# Patient Record
Sex: MALE | Race: WHITE | NOT HISPANIC OR LATINO | Employment: FULL TIME | URBAN - NONMETROPOLITAN AREA
[De-identification: names, ages, dates, MRNs, and addresses within clinical notes are randomized per-mention and may not be internally consistent; named-entity substitution may affect disease eponyms.]

---

## 2018-08-16 ENCOUNTER — OFFICE VISIT CONVERTED (OUTPATIENT)
Dept: FAMILY MEDICINE CLINIC | Age: 58
End: 2018-08-16
Attending: NURSE PRACTITIONER

## 2018-11-02 ENCOUNTER — OFFICE VISIT CONVERTED (OUTPATIENT)
Dept: FAMILY MEDICINE CLINIC | Age: 58
End: 2018-11-02
Attending: NURSE PRACTITIONER

## 2018-12-04 ENCOUNTER — OFFICE VISIT CONVERTED (OUTPATIENT)
Dept: FAMILY MEDICINE CLINIC | Age: 58
End: 2018-12-04
Attending: NURSE PRACTITIONER

## 2019-02-12 ENCOUNTER — HOSPITAL ENCOUNTER (OUTPATIENT)
Dept: OTHER | Facility: HOSPITAL | Age: 59
Discharge: HOME | End: 2019-02-12

## 2019-02-12 LAB
CONV CREATININE URINE, RANDOM: 309.2 MG/DL (ref 10–300)
CONV MICROALBUM.,U,RANDOM: 47.8 MG/L (ref 0–20)
MICROALBUMIN/CREAT UR: 15.5 MG/G{CRE} (ref 0–25)

## 2019-02-22 ENCOUNTER — OFFICE VISIT CONVERTED (OUTPATIENT)
Dept: FAMILY MEDICINE CLINIC | Age: 59
End: 2019-02-22
Attending: NURSE PRACTITIONER

## 2019-02-22 ENCOUNTER — HOSPITAL ENCOUNTER (OUTPATIENT)
Dept: OTHER | Facility: HOSPITAL | Age: 59
Discharge: HOME | End: 2019-02-22

## 2019-02-22 LAB
ALBUMIN SERPL-MCNC: 4.3 G/DL (ref 3.5–5)
ALBUMIN/GLOB SERPL: 1.9 {RATIO} (ref 1.4–2.6)
ALP SERPL-CCNC: 67 U/L (ref 56–119)
ALT SERPL-CCNC: 20 U/L (ref 10–40)
ANION GAP SERPL CALC-SCNC: 17 MMOL/L (ref 8–19)
AST SERPL-CCNC: 17 U/L (ref 15–50)
BASOPHILS # BLD MANUAL: 0.03 10*3/UL (ref 0–0.2)
BASOPHILS NFR BLD MANUAL: 0.5 % (ref 0–3)
BILIRUB SERPL-MCNC: 0.58 MG/DL (ref 0.2–1.3)
BUN SERPL-MCNC: 18 MG/DL (ref 5–25)
BUN/CREAT SERPL: 22 {RATIO} (ref 6–20)
CALCIUM SERPL-MCNC: 9.2 MG/DL (ref 8.7–10.4)
CHLORIDE SERPL-SCNC: 103 MMOL/L (ref 99–111)
CHOLEST SERPL-MCNC: 154 MG/DL (ref 107–200)
CHOLEST/HDLC SERPL: 3.4 {RATIO} (ref 3–6)
CONV CO2: 25 MMOL/L (ref 22–32)
CONV TOTAL PROTEIN: 6.6 G/DL (ref 6.3–8.2)
CREAT UR-MCNC: 0.83 MG/DL (ref 0.7–1.2)
DEPRECATED RDW RBC AUTO: 36.9 FL
EOSINOPHIL # BLD MANUAL: 0.19 10*3/UL (ref 0–0.7)
EOSINOPHIL NFR BLD MANUAL: 3.3 % (ref 0–7)
ERYTHROCYTE [DISTWIDTH] IN BLOOD BY AUTOMATED COUNT: 12 % (ref 11.5–14.5)
EST. AVERAGE GLUCOSE BLD GHB EST-MCNC: 226 MG/DL
GFR SERPLBLD BASED ON 1.73 SQ M-ARVRAT: >60 ML/MIN/{1.73_M2}
GLOBULIN UR ELPH-MCNC: 2.3 G/DL (ref 2–3.5)
GLUCOSE SERPL-MCNC: 212 MG/DL (ref 70–99)
GRANS (ABSOLUTE): 3.43 10*3/UL (ref 2–8)
GRANS: 59.8 % (ref 30–85)
HBA1C MFR BLD: 15.8 G/DL (ref 14–18)
HBA1C MFR BLD: 9.5 % (ref 3.5–5.7)
HCT VFR BLD AUTO: 46.4 % (ref 42–52)
HDLC SERPL-MCNC: 45 MG/DL (ref 40–60)
IMM GRANULOCYTES # BLD: 0.02 10*3/UL (ref 0–0.54)
IMM GRANULOCYTES NFR BLD: 0.3 % (ref 0–0.43)
LDLC SERPL CALC-MCNC: 73 MG/DL (ref 70–100)
LIPASE SERPL-CCNC: 33 U/L (ref 5–51)
LYMPHOCYTES # BLD MANUAL: 1.63 10*3/UL (ref 1–5)
LYMPHOCYTES NFR BLD MANUAL: 7.7 % (ref 3–10)
MCH RBC QN AUTO: 28.7 PG (ref 27–31)
MCHC RBC AUTO-ENTMCNC: 34.1 G/DL (ref 33–37)
MCV RBC AUTO: 84.2 FL (ref 80–96)
MONOCYTES # BLD AUTO: 0.44 10*3/UL (ref 0.2–1.2)
OSMOLALITY SERPL CALC.SUM OF ELEC: 300 MOSM/KG (ref 273–304)
PLATELET # BLD AUTO: 219 10*3/UL (ref 130–400)
PMV BLD AUTO: 10.1 FL (ref 7.4–10.4)
POTASSIUM SERPL-SCNC: 4.1 MMOL/L (ref 3.5–5.3)
RBC # BLD AUTO: 5.51 10*6/UL (ref 4.7–6.1)
SODIUM SERPL-SCNC: 141 MMOL/L (ref 135–147)
TRIGL SERPL-MCNC: 180 MG/DL (ref 40–150)
TSH SERPL-ACNC: 1.24 M[IU]/L (ref 0.27–4.2)
VARIANT LYMPHS NFR BLD MANUAL: 28.4 % (ref 20–45)
VLDLC SERPL-MCNC: 36 MG/DL (ref 5–37)
WBC # BLD AUTO: 5.74 10*3/UL (ref 4.8–10.8)

## 2019-09-05 ENCOUNTER — OFFICE VISIT CONVERTED (OUTPATIENT)
Dept: FAMILY MEDICINE CLINIC | Age: 59
End: 2019-09-05
Attending: NURSE PRACTITIONER

## 2019-09-05 ENCOUNTER — HOSPITAL ENCOUNTER (OUTPATIENT)
Dept: OTHER | Facility: HOSPITAL | Age: 59
Discharge: HOME | End: 2019-09-05

## 2019-09-05 LAB
ALBUMIN SERPL-MCNC: 4.2 G/DL (ref 3.5–5)
ALBUMIN/GLOB SERPL: 1.5 {RATIO} (ref 1.4–2.6)
ALP SERPL-CCNC: 97 U/L (ref 56–119)
ALT SERPL-CCNC: 23 U/L (ref 10–40)
ANION GAP SERPL CALC-SCNC: 20 MMOL/L (ref 8–19)
AST SERPL-CCNC: 23 U/L (ref 15–50)
BASOPHILS # BLD AUTO: 0.05 10*3/UL (ref 0–0.2)
BASOPHILS NFR BLD AUTO: 0.9 % (ref 0–3)
BILIRUB SERPL-MCNC: 0.62 MG/DL (ref 0.2–1.3)
BUN SERPL-MCNC: 14 MG/DL (ref 5–25)
BUN/CREAT SERPL: 14 {RATIO} (ref 6–20)
CALCIUM SERPL-MCNC: 9.3 MG/DL (ref 8.7–10.4)
CHLORIDE SERPL-SCNC: 99 MMOL/L (ref 99–111)
CHOLEST SERPL-MCNC: 229 MG/DL (ref 107–200)
CHOLEST/HDLC SERPL: 4.4 {RATIO} (ref 3–6)
CONV ABS IMM GRAN: 0.02 10*3/UL (ref 0–0.2)
CONV CO2: 22 MMOL/L (ref 22–32)
CONV IMMATURE GRAN: 0.3 % (ref 0–1.8)
CONV TOTAL PROTEIN: 7 G/DL (ref 6.3–8.2)
CREAT UR-MCNC: 0.97 MG/DL (ref 0.7–1.2)
DEPRECATED RDW RBC AUTO: 37.4 FL (ref 35.1–43.9)
EOSINOPHIL # BLD AUTO: 0.17 10*3/UL (ref 0–0.7)
EOSINOPHIL # BLD AUTO: 2.9 % (ref 0–7)
ERYTHROCYTE [DISTWIDTH] IN BLOOD BY AUTOMATED COUNT: 12.2 % (ref 11.6–14.4)
EST. AVERAGE GLUCOSE BLD GHB EST-MCNC: 295 MG/DL
GFR SERPLBLD BASED ON 1.73 SQ M-ARVRAT: >60 ML/MIN/{1.73_M2}
GLOBULIN UR ELPH-MCNC: 2.8 G/DL (ref 2–3.5)
GLUCOSE SERPL-MCNC: 279 MG/DL (ref 70–99)
HBA1C MFR BLD: 11.9 % (ref 3.5–5.7)
HCT VFR BLD AUTO: 47.9 % (ref 42–52)
HDLC SERPL-MCNC: 52 MG/DL (ref 40–60)
HGB BLD-MCNC: 15.9 G/DL (ref 14–18)
LDLC SERPL CALC-MCNC: 129 MG/DL (ref 70–100)
LYMPHOCYTES # BLD AUTO: 1.46 10*3/UL (ref 1–5)
LYMPHOCYTES NFR BLD AUTO: 25 % (ref 20–45)
MCH RBC QN AUTO: 28.2 PG (ref 27–31)
MCHC RBC AUTO-ENTMCNC: 33.2 G/DL (ref 33–37)
MCV RBC AUTO: 85.1 FL (ref 80–96)
MONOCYTES # BLD AUTO: 0.46 10*3/UL (ref 0.2–1.2)
MONOCYTES NFR BLD AUTO: 7.9 % (ref 3–10)
NEUTROPHILS # BLD AUTO: 3.67 10*3/UL (ref 2–8)
NEUTROPHILS NFR BLD AUTO: 63 % (ref 30–85)
NRBC CBCN: 0 % (ref 0–0.7)
OSMOLALITY SERPL CALC.SUM OF ELEC: 295 MOSM/KG (ref 273–304)
PLATELET # BLD AUTO: 229 10*3/UL (ref 130–400)
PMV BLD AUTO: 10.6 FL (ref 9.4–12.4)
POTASSIUM SERPL-SCNC: 4 MMOL/L (ref 3.5–5.3)
PSA SERPL-MCNC: 1.24 NG/ML (ref 0–4)
RBC # BLD AUTO: 5.63 10*6/UL (ref 4.7–6.1)
SODIUM SERPL-SCNC: 137 MMOL/L (ref 135–147)
TRIGL SERPL-MCNC: 238 MG/DL (ref 40–150)
TSH SERPL-ACNC: 0.56 M[IU]/L (ref 0.27–4.2)
VLDLC SERPL-MCNC: 48 MG/DL (ref 5–37)
WBC # BLD AUTO: 5.83 10*3/UL (ref 4.8–10.8)

## 2019-10-04 ENCOUNTER — OFFICE VISIT CONVERTED (OUTPATIENT)
Dept: FAMILY MEDICINE CLINIC | Age: 59
End: 2019-10-04
Attending: NURSE PRACTITIONER

## 2019-10-05 ENCOUNTER — HOSPITAL ENCOUNTER (OUTPATIENT)
Dept: OTHER | Facility: HOSPITAL | Age: 59
Discharge: HOME | End: 2019-10-05

## 2019-10-05 LAB
BASOPHILS # BLD MANUAL: 0.04 10*3/UL (ref 0–0.2)
BASOPHILS NFR BLD MANUAL: 0.7 % (ref 0–3)
DEPRECATED RDW RBC AUTO: 36.6 FL
EOSINOPHIL # BLD MANUAL: 0.2 10*3/UL (ref 0–0.7)
EOSINOPHIL NFR BLD MANUAL: 3.7 % (ref 0–7)
ERYTHROCYTE [DISTWIDTH] IN BLOOD BY AUTOMATED COUNT: 11.9 % (ref 11.5–14.5)
EST. AVERAGE GLUCOSE BLD GHB EST-MCNC: 255 MG/DL
GRANS (ABSOLUTE): 3.47 10*3/UL (ref 2–8)
GRANS: 65 % (ref 30–85)
HBA1C MFR BLD: 10.5 % (ref 3.5–5.7)
HBA1C MFR BLD: 15.9 G/DL (ref 14–18)
HCT VFR BLD AUTO: 47.1 % (ref 42–52)
IMM GRANULOCYTES # BLD: 0.02 10*3/UL (ref 0–0.54)
IMM GRANULOCYTES NFR BLD: 0.4 % (ref 0–0.43)
LYMPHOCYTES # BLD MANUAL: 1.19 10*3/UL (ref 1–5)
LYMPHOCYTES NFR BLD MANUAL: 8 % (ref 3–10)
MCH RBC QN AUTO: 28.3 PG (ref 27–31)
MCHC RBC AUTO-ENTMCNC: 33.8 G/DL (ref 33–37)
MCV RBC AUTO: 83.8 FL (ref 80–96)
MONOCYTES # BLD AUTO: 0.43 10*3/UL (ref 0.2–1.2)
PLATELET # BLD AUTO: 221 10*3/UL (ref 130–400)
PMV BLD AUTO: 9.2 FL (ref 7.4–10.4)
RBC # BLD AUTO: 5.62 10*6/UL (ref 4.7–6.1)
VARIANT LYMPHS NFR BLD MANUAL: 22.2 % (ref 20–45)
WBC # BLD AUTO: 5.35 10*3/UL (ref 4.8–10.8)

## 2019-10-29 ENCOUNTER — OFFICE VISIT CONVERTED (OUTPATIENT)
Dept: FAMILY MEDICINE CLINIC | Age: 59
End: 2019-10-29
Attending: NURSE PRACTITIONER

## 2019-11-22 ENCOUNTER — OFFICE VISIT CONVERTED (OUTPATIENT)
Dept: FAMILY MEDICINE CLINIC | Age: 59
End: 2019-11-22
Attending: NURSE PRACTITIONER

## 2019-12-18 ENCOUNTER — OFFICE VISIT CONVERTED (OUTPATIENT)
Dept: FAMILY MEDICINE CLINIC | Age: 59
End: 2019-12-18
Attending: NURSE PRACTITIONER

## 2020-11-04 ENCOUNTER — OFFICE VISIT CONVERTED (OUTPATIENT)
Dept: FAMILY MEDICINE CLINIC | Age: 60
End: 2020-11-04
Attending: NURSE PRACTITIONER

## 2021-04-21 ENCOUNTER — HOSPITAL ENCOUNTER (OUTPATIENT)
Dept: OTHER | Facility: HOSPITAL | Age: 61
Discharge: HOME | End: 2021-04-21
Attending: NURSE PRACTITIONER

## 2021-04-21 LAB
ALBUMIN SERPL-MCNC: 4.3 G/DL (ref 3.5–5)
ALBUMIN/GLOB SERPL: 1.7 {RATIO} (ref 1.4–2.6)
ALP SERPL-CCNC: 87 U/L (ref 56–119)
ALT SERPL-CCNC: 16 U/L (ref 10–40)
ANION GAP SERPL CALC-SCNC: 20 MMOL/L (ref 8–19)
AST SERPL-CCNC: 21 U/L (ref 15–50)
BILIRUB SERPL-MCNC: 0.5 MG/DL (ref 0.2–1.3)
BUN SERPL-MCNC: 21 MG/DL (ref 5–25)
BUN/CREAT SERPL: 18 {RATIO} (ref 6–20)
CALCIUM SERPL-MCNC: 9.3 MG/DL (ref 8.7–10.4)
CHLORIDE SERPL-SCNC: 102 MMOL/L (ref 99–111)
CHOLEST SERPL-MCNC: 227 MG/DL (ref 107–200)
CHOLEST/HDLC SERPL: 4.5 {RATIO} (ref 3–6)
CONV CO2: 23 MMOL/L (ref 22–32)
CONV CREATININE URINE, RANDOM: 155.2 MG/DL (ref 10–300)
CONV MICROALBUM.,U,RANDOM: <12 MG/L (ref 0–20)
CONV TOTAL PROTEIN: 6.8 G/DL (ref 6.3–8.2)
CREAT UR-MCNC: 1.16 MG/DL (ref 0.7–1.2)
EST. AVERAGE GLUCOSE BLD GHB EST-MCNC: 249 MG/DL
GFR SERPLBLD BASED ON 1.73 SQ M-ARVRAT: >60 ML/MIN/{1.73_M2}
GLOBULIN UR ELPH-MCNC: 2.5 G/DL (ref 2–3.5)
GLUCOSE SERPL-MCNC: 148 MG/DL (ref 70–99)
HBA1C MFR BLD: 10.3 % (ref 3.5–5.7)
HDLC SERPL-MCNC: 51 MG/DL (ref 40–60)
LDLC SERPL CALC-MCNC: 119 MG/DL (ref 70–100)
MICROALBUMIN/CREAT UR: 7.7 MG/G{CRE} (ref 0–25)
OSMOLALITY SERPL CALC.SUM OF ELEC: 298 MOSM/KG (ref 273–304)
POTASSIUM SERPL-SCNC: 3.9 MMOL/L (ref 3.5–5.3)
PSA SERPL-MCNC: 1.11 NG/ML (ref 0–4)
SODIUM SERPL-SCNC: 141 MMOL/L (ref 135–147)
TRIGL SERPL-MCNC: 284 MG/DL (ref 40–150)
VLDLC SERPL-MCNC: 57 MG/DL (ref 5–37)

## 2021-04-29 ENCOUNTER — OFFICE VISIT CONVERTED (OUTPATIENT)
Dept: FAMILY MEDICINE CLINIC | Age: 61
End: 2021-04-29
Attending: NURSE PRACTITIONER

## 2021-05-18 NOTE — PROGRESS NOTES
Axel Simeon 1960     Office/Outpatient Visit    Visit Date: Fri, Oct 4, 2019 04:12 pm    Provider: Carolina Owen N.P. (Assistant: Maame Christian MA)    Location: [facility]        Electronically signed by Carolina Owen N.P. on  10/04/2019 05:17:21 PM                             SUBJECTIVE:        CC:     Mr. Simeon is a 58 year old White male.  This is a follow-up visit.  diabetes check up         HPI:         Patient to be evaluated for type 2 diabetes.  Pt states blood sugar has been around 180. Previous A1C in september was 11.1. States he had been out of his meds. He was in [place] and had it called there. He is now back on it daily along with BP med daily. No concerns.      ROS:     CONSTITUTIONAL:  Negative for chills, fatigue and fever.      CARDIOVASCULAR:  Negative for chest pain, orthopnea, paroxysmal nocturnal dyspnea and pedal edema.      RESPIRATORY:  Negative for dyspnea and cough.      GASTROINTESTINAL:  Negative for abdominal pain, heartburn, constipation, diarrhea, and stool changes.      NEUROLOGICAL:  Negative for dizziness, headaches, paresthesias, and weakness.      PSYCHIATRIC:  Negative for anxiety and depression.          PMH/FMH/SH:     Last Reviewed on 10/04/2019 05:06 PM by Carolina Owen    Past Medical History:         Fracture(s): right ankle;         PREVENTIVE HEALTH MAINTENANCE             COLONOSCOPY: Done within the last 10 years was last done 11-27-16 with normal results         Surgical History:     NONE         Family History:     Unremarkable         Social History:     Occupation: supervisor     Marital Status:      Children: 3 children     Mr. Simeon denies any current form of exercise.          Tobacco/Alcohol/Supplements:     Last Reviewed on 10/04/2019 05:06 PM by Carolina Owen    Tobacco: He has never smoked.          Alcohol: Frequency: Once a month     Caffeine:  He admits to consuming caffeine via soda ( 2  servings per day ).          Substance Abuse History:     Last Reviewed on 10/04/2019 05:06 PM by Carolina Owen    None         Mental Health History:     Last Reviewed on 10/04/2019 05:06 PM by Carolina Owen        Communicable Diseases (eg STDs):     Last Reviewed on 10/04/2019 05:06 PM by Carolina Owen            Current Problems:     Last Reviewed on 10/04/2019 05:06 PM by Carolina Owen    Hyperlipidemia     Obesity, NOS     Screening for cancer of colon     Screening for hyperlipidemia     Type 2 diabetes     Meckel's diverticulum     Hypertension     Screening for depression         Immunizations:     None        Allergies:     Last Reviewed on 10/04/2019 05:06 PM by Carolina Owen      No Known Drug Allergies.         Current Medications:     Last Reviewed on 10/04/2019 05:06 PM by Carolina Owen    Janumet XR 50mg/1,000mg Tablets, Extended Release Take 1 tablet(s) by mouth twice daily with meals     Glucose Reagent Blood Test Strips  Reagent Strips contour strips BID to QID testing daily  Diag 250.00     Sam Contour Meter (Blood Glucose Meter) Kit use with sam contour test strips  Diag 250.00  #100 (One [place]) kit(s)     Lisinopril 5mg Tablet 1 tab daily     Victoza 3-Javier (Liraglutide) 18mg/3ml Injection 1.8MG SC qd  #9 (Nine) each         OBJECTIVE:        Vitals:         Current: 10/4/2019 4:16:35 PM    Ht:  5 ft, 11.5 in;  Wt: 211.2 lbs;  BMI: 29.0    T: 98.3 F (oral);  BP: 117/68 mm Hg (left arm, sitting);  P: 88 bpm (left arm (BP Cuff), sitting);  sCr: 0.97 mg/dL;  GFR: 87.39        Exams:     PHYSICAL EXAM:     GENERAL: Vitals recorded well developed, well nourished;  well groomed;  no apparent distress;     EYES: lids and lacrimal system are normal in appearance; extraocular movements intact; conjunctiva and cornea are normal; PERRLA;     NECK:  supple, full ROM; no thyromegaly; no carotid bruits;     RESPIRATORY: normal respiratory rate and pattern with no distress; normal  breath sounds with no rales, rhonchi, wheezes or rubs;     CARDIOVASCULAR: normal rate; rhythm is regular;  normal S1; normal S2; no systolic murmur; no cyanosis; no edema;     GASTROINTESTINAL: nontender, nondistended; no hepatosplenomegaly or masses; no bruits;     SKIN:  no significant rashes or lesions; no suspicious moles;     MUSCULOSKELETAL:  Normal range of motion, strength and tone;     NEUROLOGICAL:  cranial nerves, motor and sensory function, reflexes, gait and coordination are all intact;     PSYCHIATRIC:  appropriate affect and demeanor; normal speech pattern; grossly normal memory;         ASSESSMENT:           250.00   E11.8  Type 2 diabetes              DDx:         ORDERS:         Lab Orders:       [id number]  [facility] CBC with 3 part diff  (Send-Out)         72023  A1CEG - [facility] Hemoglobin A1C  (Send-Out)                   PLAN: Pt refuses flu shot.          Type 2 diabetes     LABORATORY:  Labs ordered to be performed today include CBC and HgbA1C.      FOLLOW-UP:.  :for determined with labs returned:Chronic visit follow up           Prescriptions: continue meds until labs returned. Will discuss any necessary changes then.           Orders:       [id number]  [facility] CBC with 3 part diff  (Send-Out)         25725  A1CEG - [facility] Hemoglobin A1C  (Send-Out)               Patient Recommendations:        For  Type 2 diabetes:                     APPOINTMENT INFORMATION:        Monday Tuesday Wednesday Thursday Friday Saturday Sunday            Time:___________________AM  PM   Date:_____________________             CHARGE CAPTURE:           Primary Diagnosis:     250.00 Type 2 diabetes            E11.8    Type 2 diabetes mellitus with unspecified complications              Orders:          24704   Office/outpatient visit; established patient, level 3  (In-House)

## 2021-05-18 NOTE — PROGRESS NOTES
Axel Simeon 1960     Office/Outpatient Visit    Visit Date: Thu, Aug 16, 2018 11:57 am    Provider: Carolina Owen N.P. (Assistant: Shannen Liz MA)    Location: [facility]        Electronically signed by Carolina Owen N.P. on  08/20/2018 08:45:10 AM                             SUBJECTIVE:        CC:     Mr. Simeon is a 57 year old White male.  This is a follow-up visit.          HPI:         Patient presents with hypertension.  Pt states his bp is normally lower than today's reading. He continues to monitor at home.          Concerning type 2 diabetes, pt states blood sugar has been around 160's. He ran out of his medicine and pharm would not refill. He was told he needed a visit.      ROS:     CONSTITUTIONAL:  Negative for chills, fatigue, fever and weight change.      CARDIOVASCULAR:  Negative for chest pain, orthopnea, paroxysmal nocturnal dyspnea and pedal edema.      RESPIRATORY:  Negative for dyspnea and cough.      GASTROINTESTINAL:  Negative for abdominal pain, heartburn, constipation, diarrhea, and stool changes.      NEUROLOGICAL:  Negative for dizziness, headaches, paresthesias, and weakness.      PSYCHIATRIC:  Negative for anxiety and depression.          PMH/FMH/SH:     Last Reviewed on 8/16/2018 12:15 PM by Carolina Owen    Past Medical History:         Fracture(s): right ankle;         PREVENTIVE HEALTH MAINTENANCE             COLONOSCOPY: Done within the last 10 years was last done 11-27-16 with normal results         Surgical History:     NONE         Family History:     Unremarkable         Social History:     Occupation: supervisor     Marital Status:      Children: 3 children     Mr. Simeon denies any current form of exercise.          Tobacco/Alcohol/Supplements:     Last Reviewed on 8/16/2018 12:15 PM by Carolina Owen    Tobacco:  Nonsmoker (never smoked);         Alcohol: Frequency: Once a month     Caffeine:  He admits to consuming  caffeine via soda ( 2 servings per day ).          Substance Abuse History:     Last Reviewed on 8/16/2018 12:15 PM by Carolina Owen    None         Mental Health History:     Last Reviewed on 8/16/2018 12:15 PM by Carolina Owen        Communicable Diseases (eg STDs):     Last Reviewed on 8/16/2018 12:15 PM by Carolina Owen            Current Problems:     Last Reviewed on 8/16/2018 12:15 PM by Carolina Owen    Screening for cancer of colon     Screening for hyperlipidemia     Type 2 diabetes     Meckel's diverticulum     Hypertension         Immunizations:     None        Allergies:     Last Reviewed on 8/16/2018 12:15 PM by Carolina Owen      No Known Drug Allergies.         Current Medications:     Last Reviewed on 8/16/2018 12:15 PM by Carolina Owen    Janumet XR 50mg/1,000mg Tablets, Extended Release Take 1 tablet(s) by mouth twice daily with meals     Glucose Reagent Blood Test Strips  Reagent Strips contour strips BID to QID testing daily  Diag 250.00     Sam Contour Meter (Blood Glucose Meter) Kit use with sam contour test strips  Diag 250.00  #100 (One [place]) kit(s)         OBJECTIVE:        Vitals:         Current: 8/16/2018 12:00:48 PM    Ht:  5 ft, 11.5 in;  Wt: 209.4 lbs;  BMI: 28.8    T: 97.5 F (oral);  BP: 142/92 mm Hg (left arm, sitting);  P: 75 bpm (left arm (BP Cuff), sitting);  sCr: 0.93 mg/dL;  GFR: 91.89        Exams:     PHYSICAL EXAM:     GENERAL: Vitals recorded well developed, well nourished;  well groomed;  no apparent distress;     EYES: lids and lacrimal system are normal in appearance; extraocular movements intact; conjunctiva and cornea are normal; PERRLA;     NECK:  supple, full ROM; no thyromegaly; no carotid bruits;     RESPIRATORY: normal respiratory rate and pattern with no distress; normal breath sounds with no rales, rhonchi, wheezes or rubs;     CARDIOVASCULAR: normal rate; rhythm is regular;  normal S1; normal S2; no systolic murmur; no cyanosis;  no edema;     GASTROINTESTINAL: nontender, nondistended; no hepatosplenomegaly or masses; no bruits;     SKIN:  no significant rashes or lesions; no suspicious moles;     MUSCULOSKELETAL:  Normal range of motion, strength and tone;     NEUROLOGICAL:  cranial nerves, motor and sensory function, reflexes, gait and coordination are all intact;     PSYCHIATRIC:  appropriate affect and demeanor; normal speech pattern; grossly normal memory;     Left foot exam    Protective sensation using Monofilament test: NORMAL sensation. Patient detects .07 grams of force which is considered normal.    Vascular status: normal peripheral vascular exam with palpable dorsal pedal and posterior tibal pulses and brisk digital capillary refill    Skin is intact without sores or ulcers    Right foot exam    Protective sensation using Monofilament test: NORMAL sensation. Patient detects .07 grams of force which is considered normal.    Vascular status: normal peripheral vascular exam with palpable dorsal pedal and posterior tibal pulses and brisk digital capillary refill    Skin is intact without sores or ulcers         ASSESSMENT:           401.1   I10  Hypertension              DDx:     250.00   E11.8  Type 2 diabetes              DDx:     V77.91   Z13.220  Screening for hyperlipidemia              DDx:     V76.44   Z12.5  Screening for prostate cancer              DDx:         ORDERS:         Meds Prescribed:       Refill of: Janumet XR (Sitagliptin/Metformin HCl) 50mg/1,000mg Tablets, Extended Release Take 1 tablet(s) by mouth twice daily with meals  #180 (One [place]) tablet(s) Refills: 1         Lab Orders:       [id number]  [facility] CBC with 3 part diff  (Send-Out)         89643  A1CEG [facility] Hemoglobin A1C  (Send-Out)         70742  COMP [facility] Comp. Metabolic Panel  (Send-Out)         53857  LPDP [facility] Lipid Panel  (Send-Out)         50198  PRSAS [facility] PSA Screen or Medicare screening order:   (Send-Out)         APPTO   Appointment need  (In-House)           Other Orders:       2028F  Foot examination performed (includes examination through visual inspection, sensory exam with monofi  (In-House)                   PLAN: Pt due for eye exam and he will schedule with Dr. Estrada.          Hypertension         RECOMMENDATIONS given include: keep blood pressure log as directed.           Type 2 diabetes     LABORATORY:  Labs ordered to be performed today include CBC, Comprehensive metabolic panel, and HgbA1C.      FOLLOW-UP: Schedule a follow-up visit in 6 months..   Chronic visit follow up           Prescriptions:       Refill of: Janumet XR (Sitagliptin/Metformin HCl) 50mg/1,000mg Tablets, Extended Release Take 1 tablet(s) by mouth twice daily with meals  #180 (One [place] and [place]) tablet(s) Refills: 1           Orders:       [id number]  BDCBC [facility] CBC with 3 part diff  (Send-Out)         55065  A1CEG [facility] Hemoglobin A1C  (Send-Out)         14569  COMP [facility] Comp. Metabolic Panel  (Send-Out)         APPTO  Appointment need  (In-House)             Patient Education Handouts:       Diabetes (Foot and Skin Problems)           Screening for hyperlipidemia           Orders:       03114  LPDP [facility] Lipid Panel  (Send-Out)            Screening for prostate cancer           Orders:       02832  [facility] PSA Screen or Medicare screening order:   (Send-Out)               Other Orders:       2028F  Foot examination performed (includes examination through visual inspection, sensory exam with monofi  (In-House)           Patient Recommendations:        For  Hypertension:     Keep a daily blood pressure log and report elevated blood pressure to provider as directed.          For  Type 2 diabetes:     Schedule a follow-up visit in 6 months.                APPOINTMENT INFORMATION:        Monday Tuesday Wednesday Thursday Friday Saturday Sunday            Time:___________________AM  PM   Date:_____________________             CHARGE  CAPTURE:           Primary Diagnosis:     401.1 Hypertension            I10    Essential (primary) hypertension              Orders:          52635   Office/outpatient visit; established patient, level 3  (In-House)           250.00 Type 2 diabetes            E11.8    Type 2 diabetes mellitus with unspecified complications              Orders:          APPTO   Appointment need  (In-House)           V77.91 Screening for hyperlipidemia            Z13.220    Encounter for screening for lipoid disorders    V76.44 Screening for prostate cancer            Z12.5    Encounter for screening for malignant neoplasm of prostate        Other Orders:           2028F   Foot examination performed (includes examination through visual inspection, sensory exam with monofi  (In-House)

## 2021-05-18 NOTE — PROGRESS NOTES
"Axel Simeon 1960     Office/Outpatient Visit    Visit Date: Fri, Nov 2, 2018 02:07 pm    Provider: Carolina Owen N.P. (Assistant: Rubia Pedro MA)    Location: [facility]        Electronically signed by Carolina Owen N.P. on  11/06/2018 09:05:45 AM                             SUBJECTIVE:        CC: Pt also seen by AGUSTIN Quintana NP Student     Mr. Simeon is a 58 year old White male.  presents today due to complaints of lower back pain X 2-3 days ago, states he thinks he may have kidney stone         HPI:         R flank pain:  Reports pain began in R flank on Monday evening;  States he had a kidney stone on L side in April and was told the tech saw a \"small\" stone in his R side during that time.  States he has not had s/s until now on the R side;  Reports finishing up pain meds from last stone; requesting refill today;     ROS:     CONSTITUTIONAL:  Positive for chills.   Negative for fatigue, fever or weight change.      CARDIOVASCULAR:  Negative for chest pain, orthopnea, paroxysmal nocturnal dyspnea and pedal edema.      RESPIRATORY:  Negative for dyspnea and cough.      GASTROINTESTINAL:  Positive for abdominal pain, abdominal bloating, nausea and reports all s/s intermittent.   Negative for vomiting.      GENITOURINARY:  Positive for hematuria and decreased force of urine stream.   Negative for dysuria or urinary incontinence.      MUSCULOSKELETAL:  Positive for Intermittent R flank pain.      PSYCHIATRIC:  Negative for anxiety and depression.          PMH/FM/SH:     Last Reviewed on 8/16/2018 12:15 PM by Carolina Owen    Past Medical History:         Fracture(s): right ankle;         PREVENTIVE HEALTH MAINTENANCE             COLONOSCOPY: Done within the last 10 years was last done 11-27-16 with normal results         Surgical History:     NONE         Family History:     Unremarkable         Social History:     Occupation: supervisor     Marital Status:      " Children: 3 children     Mr. Simeon denies any current form of exercise.          Tobacco/Alcohol/Supplements:     Last Reviewed on 11/02/2018 02:10 PM by Rubia Pedro    Tobacco:  Nonsmoker (never smoked);         Alcohol: Frequency: Once a month     Caffeine:  He admits to consuming caffeine via soda ( 2 servings per day ).          Substance Abuse History:     Last Reviewed on 8/16/2018 12:15 PM by Carolina Owen         Mental Health History:     Last Reviewed on 8/16/2018 12:15 PM by Carolina Owen        Communicable Diseases (eg STDs):     Last Reviewed on 8/16/2018 12:15 PM by Carolina Owen            Current Problems:     Last Reviewed on 8/16/2018 12:15 PM by Carolina Owen    Screening for hyperlipidemia     Screening for cancer of colon     Type 2 diabetes     Meckel's diverticulum     Hypertension     Right Lower Flank Pain         Immunizations:     None        Allergies:     Last Reviewed on 11/02/2018 02:10 PM by Rubia Pedro      No Known Drug Allergies.         Current Medications:     Last Reviewed on 11/02/2018 02:10 PM by Rubia Pedro    Janumet XR 50mg/1,000mg Tablets, Extended Release Take 1 tablet(s) by mouth twice daily with meals     Glucose Reagent Blood Test Strips  Reagent Strips contour strips BID to QID testing daily  Diag 250.00     Sam Contour Meter (Blood Glucose Meter) Kit use with sam contour test strips  Diag 250.00  #100 (One [place]) kit(s)     Hydrocodone/Acetaminophen 7.5mg/325mg Tablet ONE A DAY PRN         OBJECTIVE:        Vitals:         Current: 11/2/2018 2:14:59 PM    Ht:  5 ft, 11.5 in;  Wt: 223.2 lbs;  BMI: 30.7    T: 97.4 F (oral);  BP: 157/92 mm Hg (left arm, sitting);  P: 107 bpm (left arm (BP Cuff), sitting);  sCr: 0.93 mg/dL;  GFR: 93.31        Exams:     PHYSICAL EXAM:     GENERAL: Vitals recorded well developed, well nourished;  well groomed;  no apparent distress;     RESPIRATORY: normal respiratory rate and pattern with no  distress; normal breath sounds with no rales, rhonchi, wheezes or rubs;     CARDIOVASCULAR: normal rate; rhythm is regular;  normal S1; normal S2; no systolic murmur; no cyanosis; no edema;     GASTROINTESTINAL: nontender, nondistended; no hepatosplenomegaly or masses; no bruits;     SKIN:  no significant rashes or lesions; no suspicious moles;     MUSCULOSKELETAL:  Normal range of motion, strength and tone;     NEUROLOGIC: mental status: alert and oriented x 3;     PSYCHIATRIC:  appropriate affect and demeanor; normal speech pattern; grossly normal memory;         Lab/Test Results:             Amphetamines Screen, Urin:  Negative (11/02/2018),     BAR-Barbiturates Screen, Urin:  Negative (11/02/2018),     Buprenorphine:  Negative (11/02/2018),     BZO-Benzodiazepines Screen,Ur:  Negative (11/02/2018),     Cocaine(Metab.)Screen, Ur:  Negative (11/02/2018),     MDMA-Ecstasy:  Negative (11/02/2018),     Met-Methamphetamine:  Negative (11/02/2018),     MTD-Methadone Screen, Urine:  Negative (11/02/2018),     Opiate Screen, Urine:  Positive (11/02/2018),     OXY-Oxycodone:  Negative (11/02/2018),     PCP-Phencyclidine Screen, Uri:  Negative (11/02/2018),     THC Cannabinoids Screen, Urin:  Negative (11/02/2018),     Urine temperature:  confirmed (11/02/2018),     Date and time of last pill:  Norco 11/2/18@9am/AS (11/02/2018),     Performed by:  riaz (11/02/2018),     Collection Time:  1525 (11/02/2018),             ASSESSMENT:           789.03   R10.31   M54.5  Right Lower Flank Pain              DDx:         ORDERS:         Meds Prescribed:       Refill of: Hydrocodone/Acetaminophen 7.5mg/325mg Tablet 1 PO Q4-6 HOURS PRN PAIN  #20 (Twenty) tablet(s) Refills: 0       Flomax (Tamsulosin HCl) 0.4mg Capsules 1 tab daily  #30 (Thirty) capsule(s) Refills: 0         Radiology/Test Orders:       [id number]  Radiologic examination, abdomen; single anteroposterior view  (Send-Out)           Lab Orders:       [id number]  BDUAM - [facility]  Urinalysis, automated, with micro  (Send-Out)         86673  Drug test prsmv qual dir optical obs per day  (In-House)                   PLAN:          Right Lower Flank Pain Urine screen kit sent home with patient     LABORATORY:  Labs ordered to be performed today include Drug screen and urinalysis with micro.      RADIOLOGY:  I have ordered a KUB to be done today.      FOLLOW-UP: Advised to call if there is no improvement..   for Annual Checkup           Prescriptions:       Refill of: Hydrocodone/Acetaminophen 7.5mg/325mg Tablet 1 PO Q4-6 HOURS PRN PAIN  #20 (Twenty) tablet(s) Refills: 0       Flomax (Tamsulosin HCl) 0.4mg Capsules 1 tab daily  #30 (Thirty) capsule(s) Refills: 0           Orders:       [id number]  [facility] Urinalysis, automated, with micro  (Send-Out)         19152  Radiologic examination, abdomen; single anteroposterior view  (Send-Out)         26036  Drug test prsmv qual dir optical obs per day  (In-House)               Patient Recommendations:        For  Right Lower Flank Pain:                     APPOINTMENT INFORMATION:        Monday Tuesday Wednesday Thursday Friday Saturday Sunday            Time:___________________AM  PM   Date:_____________________             CHARGE CAPTURE:           Primary Diagnosis:     789.03 Right Lower Flank Pain            R10.31    Right lower quadrant pain           M54.5    Low back pain              Orders:          00793   Office/outpatient visit; established patient, level 3  (In-House)             00406   Drug test prsmv qual dir optical obs per day  (In-House)

## 2021-05-18 NOTE — PROGRESS NOTES
Axel SimeonJulio 1960     Office/Outpatient Visit    Visit Date: Fri, Feb 22, 2019 08:29 am    Provider: Carolina Owen N.P. (Assistant: Juliann Douglas MA)    Location: [facility]        Electronically signed by Carolina Owen N.P. on  02/25/2019 08:40:50 AM                             SUBJECTIVE:        CC:     Mr. Simeon is a 58 year old White male.  This is a follow-up visit.  chronic illnesses         HPI:         Patient presents with hypertension.  Pt states doing well on med. Here for f/u and refills.          Dx with type 2 diabetes; pt states doing well on med. His blood sugar has been in the 100's. Here for labs and refills.          Dx with hyperlipidemia; pt states doing well on med. Here for f/u and refills.      ROS:     CONSTITUTIONAL:  Negative for chills, fatigue, fever and weight change.      CARDIOVASCULAR:  Negative for chest pain, orthopnea, paroxysmal nocturnal dyspnea and pedal edema.      RESPIRATORY:  Negative for dyspnea and cough.      GASTROINTESTINAL:  Negative for abdominal pain, heartburn, constipation, diarrhea, and stool changes.      MUSCULOSKELETAL:  Negative for arthralgias, back pain, and myalgias.      NEUROLOGICAL:  Negative for dizziness, headaches, paresthesias, and weakness.      PSYCHIATRIC:  Negative for anxiety and depression.          PMH/FMH/SH:     Last Reviewed on 2/22/2019 08:35 AM by Carolina Owen    Past Medical History:         Fracture(s): right ankle;         PREVENTIVE HEALTH MAINTENANCE             COLONOSCOPY: Done within the last 10 years was last done 11-27-16 with normal results         Surgical History:     NONE         Family History:     Unremarkable         Social History:     Occupation: supervisor     Marital Status:      Children: 3 children     Mr. Simeon denies any current form of exercise.          Tobacco/Alcohol/Supplements:     Last Reviewed on 2/22/2019 08:35 AM by Carolina Owen     Tobacco:  Nonsmoker (never smoked);         Alcohol: Frequency: Once a month     Caffeine:  He admits to consuming caffeine via soda ( 2 servings per day ).          Substance Abuse History:     Last Reviewed on 2/22/2019 08:35 AM by Carolina Owen         Mental Health History:     Last Reviewed on 2/22/2019 08:35 AM by Carolina Owen        Communicable Diseases (eg STDs):     Last Reviewed on 2/22/2019 08:35 AM by Carolina Owen            Current Problems:     Last Reviewed on 2/22/2019 08:35 AM by Carolina Owen    Obesity, NOS     Screening for cancer of colon     Screening for hyperlipidemia     Type 2 diabetes     Meckel's diverticulum     Hypertension         Immunizations:     None        Allergies:     Last Reviewed on 2/22/2019 08:35 AM by Carolina Owen      No Known Drug Allergies.         Current Medications:     Last Reviewed on 2/22/2019 08:35 AM by Carolina Owen    Janumet XR 50mg/1,000mg Tablets, Extended Release Take 1 tablet(s) by mouth twice daily with meals     Glucose Reagent Blood Test Strips  Reagent Strips contour strips BID to QID testing daily  Diag 250.00     Sam Contour Meter (Blood Glucose Meter) Kit use with sam contour test strips  Diag 250.00  #100 ([employer]) kit(s)     Simvastatin 20mg Tablet 1 tab daily         OBJECTIVE:        Vitals:         Current: 2/22/2019 8:34:14 AM    Ht:  5 ft, 11.5 in;  Wt: 218.6 lbs;  BMI: 30.1    T: 97.3 F (oral);  BP: 141/76 mm Hg (right arm, sitting);  P: 70 bpm (right arm (BP Cuff), sitting);  sCr: 1.06 mg/dL;  GFR: 81.15        Exams:     PHYSICAL EXAM:     GENERAL: Vitals recorded well developed, well nourished;  well groomed;  no apparent distress;     EYES: lids and lacrimal system are normal in appearance; extraocular movements intact; conjunctiva and cornea are normal; PERRLA;     NECK:  supple, full ROM; no thyromegaly; no carotid bruits;     RESPIRATORY: normal respiratory rate and pattern with no  distress; normal breath sounds with no rales, rhonchi, wheezes or rubs;     CARDIOVASCULAR: normal rate; rhythm is regular;  normal S1; normal S2; no systolic murmur; no cyanosis; no edema;     GASTROINTESTINAL: nontender, nondistended; no hepatosplenomegaly or masses; no bruits;     SKIN:  no significant rashes or lesions; no suspicious moles;     MUSCULOSKELETAL:  Normal range of motion, strength and tone;     NEUROLOGICAL:  cranial nerves, motor and sensory function, reflexes, gait and coordination are all intact;     PSYCHIATRIC:  appropriate affect and demeanor; normal speech pattern; grossly normal memory;         ASSESSMENT:           401.1   I10  Hypertension              DDx:     250.00   E11.8  Type 2 diabetes              DDx:     272.4   E78.5  Hyperlipidemia              DDx:         ORDERS:         Meds Prescribed:       Lisinopril 10mg Tablet 1 tab daily  #30 (Thirty) tablet(s) Refills: 2         Lab Orders:       29913  BDCBC - [facility] CBC with 3 part diff  (Send-Out)         93405  COMP - [facility] Comp. Metabolic Panel  (Send-Out)         13069  LIP - [facility] Lipase, Serum  (Send-Out)         67974  TSH - [facility] TSH  (Send-Out)         24430  LPDP - [facility] Lipid Panel  (Send-Out)         21436  A1CEG - [facility] Hemoglobin A1C  (Send-Out)                   PLAN:          Hypertension           Prescriptions:       Lisinopril 10mg Tablet 1 tab daily  #30 (Thirty) tablet(s) Refills: 2          Type 2 diabetes     LABORATORY:  Labs ordered to be performed today include CBC, Comprehensive metabolic panel, HgbA1C, Lipase, and TSH.            Orders:       [id number]  BDCBC - [facility] CBC with 3 part diff  (Send-Out)         28520  COMP - [facility] Comp. Metabolic Panel  (Send-Out)         75050  LIP - [facility] Lipase, Serum  (Send-Out)         38938  TSH - [facility] TSH  (Send-Out)         91856  A1CEG - [facility] Hemoglobin A1C  (Send-Out)            Hyperlipidemia     LABORATORY:  Labs ordered to be performed today include lipid panel.            Orders:        96270  [facility] Lipid Panel  (Send-Out)               CHARGE CAPTURE:           Primary Diagnosis:     401.1 Hypertension            I10    Essential (primary) hypertension              Orders:          82564   Office/outpatient visit; established patient, level 4  (In-House)           250.00 Type 2 diabetes            E11.8    Type 2 diabetes mellitus with unspecified complications    272.4 Hyperlipidemia            E78.5    Hyperlipidemia, unspecified

## 2021-05-18 NOTE — PROGRESS NOTES
SimeonAxel dan ZANE  1960     Office/Outpatient Visit    Visit Date: Thu, Apr 29, 2021 03:42 pm    Provider: Paola Romero N.P. (Assistant: Saniya Westbrook MA)    Location: [facility]        Electronically signed by Paola Romero N.P. on  05/31/2021 01:35:31 PM                             Subjective:        CC:     HPI: Axel is here to follow up on diabetes and to discuss recent labs. Recent A1C was 10.3. He reports that he sometimes skips his Janumet but does take Humalog regularly. Taking 20 units once daily in AM. Reports readings typically in 160s when checking. He admits that he has not been going to the gym as previous. Previously met with dietician/diabetes educator and declines to see them again. Reports last optometry appt was March 2021 at [facility].    Cholesterol also elevated on recent labs. He has not been taking pravastatin.     BP typically 129/80s when checking at work. He has not been taking his Lisinopril.    ROS:     CONSTITUTIONAL:  Negative for chills and fever.      CARDIOVASCULAR:  Negative for chest pain and palpitations.      RESPIRATORY:  Negative for dyspnea and frequent wheezing.      GASTROINTESTINAL:  Negative for abdominal pain and vomiting.      PSYCHIATRIC:  Negative for depression and suicidal thoughts.          Past Medical History / Family History / Social History:         Last Reviewed on 12/18/2019 05:47 PM by Paola Romero    Past Medical History:             PAST MEDICAL HISTORY         Fracture(s): right ankle;     Type 2 Diabetes         PREVENTIVE HEALTH MAINTENANCE             COLORECTAL CANCER SCREENING: Up to date (colonoscopy q10y; sigmoidoscopy q5y; Cologuard q3y) was last done 11/21/16, Results are in chart; colonoscopy with normal results; Dr Mcginnis     EYE EXAM: Diabetic Eye Exam during this calendar year and results are in chart was last done 2/27/2020 [facility]- cataracts, no diabetic retinopathy     PSA: was last done 4/21/21 with  normal results         Surgical History:         Positive for    intestine removal for diverticulitis in 2009- Dr. 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          Alcohol: Frequency: Once a month         Substance Abuse History:     Last Reviewed on 11/22/2019 04:29 PM by Carolina Owen         Mental Health History:     Last Reviewed on 11/22/2019 04:29 PM by Carolina Owen        Communicable Diseases (eg STDs):     Last Reviewed on 11/22/2019 04:29 PM by Carolina Owen        Current Problems:     Last Reviewed on 11/22/2019 04:29 PM by Carolina Owen    Meckel's diverticulum    Essential (primary) hypertension    Type 2 diabetes mellitus with unspecified complications    Encounter for screening for lipoid disorders    Encounter for screening for malignant neoplasm of colon    Dietary counseling and surveillance    Obesity, unspecified    Hyperlipidemia, unspecified    Coronavirus infection, unspecified    Encounter for screening for malignant neoplasm of prostate        Immunizations:     None        Allergies:     Last Reviewed on 11/04/2020 10:13 AM by Jennifer Ramos    No Known Allergies.        Current Medications:     Last Reviewed on 11/04/2020 10:13 AM by Jennifer Ramos    Glucose Reagent Blood Test Strips  Reagent Strips [ contour strips BID to QID testing daily  Diag 250.00]    Contour Meter  Kit [use with otis contour test strips  Diag 250.00]    Janumet XR 50-1,000 mg oral Tablet, Extended Release Multiphase 24 hr [Take 1 tablet(s) by mouth twice daily with meals]    HumaLOG Mix 75-25 KwikPen 100 unit/mL (75-25) subcutaneous Insulin Pen [INJECT 10 UNITS SUBCUTANEOUSLY TWICE DAILY]    pravastatin 10 mg oral  tablet [Take 1 tablet by mouth once daily]    lisinopriL 5 mg oral tablet [take 1 tablet (5 mg) by oral route once daily]    EQ FLUTICASONE (OTC) 50MCG SPR  [USE 2 SPRAY(S) IN EACH NOSTRIL ONCE DAILY AS DIRECTED]    promethazine-DM 6.25-15 mg/5 mL oral Syrup [take 5 milliliters by oral route every 4 hours as needed]    Zithromax 250 mg oral tablet [take 2 tablets (500 mg) by oral route once daily for 1 day then 1 tablet (250 mg) by oral route once daily for 4 days]    albuterol sulfate 90 mcg/actuation Inhalation HFA Aerosol Inhaler [inhale 1 - 2 puffs (90 - 180 mcg) by inhalation route every 4 hours as needed]        Objective:        Vitals:         Historical:     12/18/2019  BP:   121/80 mm Hg ( (right arm, , sitting, );) 12/18/2019  P:   76bpm ( (right arm (BP Cuff), , sitting, );) 12/18/2019  Wt:   217.2lbs    Current: 4/29/2021 3:49:45 PM    Ht:  5 ft, 11.5 in;  Wt: 221.6 lbs;  BMI: 30.5T: 96.2 F (temporal);  BP: 139/88 mm Hg (left arm, sitting);  P: 66 bpm (left arm (BP Cuff), sitting);  sCr: 1.16 mg/dL;  GFR: 72.82        Exams:     PHYSICAL EXAM:     GENERAL: well developed, well nourished;  no apparent distress;     RESPIRATORY: normal respiratory rate and pattern with no distress; normal breath sounds with no rales, rhonchi, wheezes or rubs;     CARDIOVASCULAR: normal rate; rhythm is regular;     NEUROLOGIC: mental status: alert and oriented x 3; GROSSLY INTACT     PSYCHIATRIC: appropriate affect and demeanor;     Left foot exam    Protective sensation using Monofilament test: NORMAL sensation. Patient detects .07 grams of force which is considered normal.    Vascular status: normal peripheral vascular exam with palpable dorsal pedal and posterior tibal pulses and brisk digital capillary refill    Skin is intact without sores or ulcers    Right foot exam    Protective sensation using Monofilament test: NORMAL sensation. Patient detects .07 grams of force which is considered normal.    Vascular status:  normal peripheral vascular exam with palpable dorsal pedal and posterior tibal pulses and brisk digital capillary refill    Skin is intact without sores or ulcers         Assessment:         E11.8   Type 2 diabetes mellitus with unspecified complications       I10   Essential (primary) hypertension       E78.5   Hyperlipidemia, unspecified       Z13.31   Encounter for screening for depression       Z91.19   Patient's noncompliance with other medical treatment and regimen           ORDERS:         Meds Prescribed:       [Refilled] pravastatin 10 mg oral tablet [Take 1 tablet by mouth once daily], #90 (ninety) each, Refills: 0 (zero)       [Refilled] lisinopriL 5 mg oral tablet [take 1 tablet (5 mg) by oral route once daily], #90 (ninety) tablets, Refills: 0 (zero)       [Refilled] Janumet XR 50-1,000 mg oral Tablet, Extended Release Multiphase 24 hr [Take 1 tablet(s) by mouth twice daily with meals], #180 (one hundred and eighty) tablets, Refills: 0 (zero)       [Refilled] HumaLOG Mix 75-25 KwikPen 100 unit/mL (75-25) subcutaneous Insulin Pen [INJECT 10 UNITS SUBCUTANEOUSLY TWICE DAILY], #15 (fifteen) milliliters, Refills: 0 (zero)         Radiology/Test Orders:       3017F  Colorectal CA screen results documented and reviewed (PV)  (In-House)            2022F  Dilated retinal eye exam w/interpret by ophthalmologist/optometrist documented/reviewed (DM)4  (In-House)              Lab Orders:       APPTO  Appointment need  (In-House)              Other Orders:       2028F  Foot examination performed (includes examination through visual inspection, sensory exam with monofilament, and pulse exam - report when any of the three components are completed) (DM)4  (In-House)              Depression screen negative  (In-House)                      Plan:         Type 2 diabetes mellitus with unspecified complicationsLabs reviewed with patient. Discussed poor control of diabetes with A1C at 10.3. Discussed potential  complications including cardiovascular events, renal complications. Discussed importance of compliance with medications, tests, and follow up appts. Verbalized understanding.    MIPS Vaccines Flu and Pneumonia updated in Shot record Colorectal Cancer Screening is up to date and the results are in the chart Diabetic Eye Exam during this calendar year and results are in chart     FOLLOW-UP: Schedule a follow-up visit in 3 months.            Prescriptions:       [Refilled] pravastatin 10 mg oral tablet [Take 1 tablet by mouth once daily], #90 (ninety) each, Refills: 0 (zero)       [Refilled] lisinopriL 5 mg oral tablet [take 1 tablet (5 mg) by oral route once daily], #90 (ninety) tablets, Refills: 0 (zero)       [Refilled] Janumet XR 50-1,000 mg oral Tablet, Extended Release Multiphase 24 hr [Take 1 tablet(s) by mouth twice daily with meals], #180 (one hundred and eighty) tablets, Refills: 0 (zero)       [Refilled] HumaLOG Mix 75-25 KwikPen 100 unit/mL (75-25) subcutaneous Insulin Pen [INJECT 10 UNITS SUBCUTANEOUSLY TWICE DAILY], #15 (fifteen) milliliters, Refills: 0 (zero)           Orders:       APPTO  Appointment need  (In-House)            3017F  Colorectal CA screen results documented and reviewed (PV)  (In-House)            2022F  Dilated retinal eye exam w/interpret by ophthalmologist/optometrist documented/reviewed (DM)4  (In-House)              Essential (primary) hypertensionResume Lisinopril as above        Hyperlipidemia, unspecifiedResume pravastatin as above        Encounter for screening for depression    MIPS PHQ-9 Depression Screening: Completed form scanned and in chart; Total Score 3; Negative Depression Screen           Orders:         Depression screen negative  (In-House)              Patient's noncompliance with other medical treatment and regimenDiscussed importance of compliance with medications and other treatment.             Other Orders      2028F  Foot examination performed (includes  examination through visual inspection, sensory exam with monofilament, and pulse exam - report when any of the three components are completed) (DM)4  (In-House)              Patient Recommendations:        For  Type 2 diabetes mellitus with unspecified complications:    Schedule a follow-up visit in 3 months.              Charge Capture:         Primary Diagnosis:     E11.8  Type 2 diabetes mellitus with unspecified complications           Orders:      28994  Office/outpatient visit; established patient, level 4  (In-House)            APPTO  Appointment need  (In-House)            3017F  Colorectal CA screen results documented and reviewed (PV)  (In-House)            2022F  Dilated retinal eye exam w/interpret by ophthalmologist/optometrist documented/reviewed (DM)4  (In-House)              I10  Essential (primary) hypertension     E78.5  Hyperlipidemia, unspecified     Z13.31  Encounter for screening for depression           Orders:        Depression screen negative  (In-House)              Z91.19  Patient's noncompliance with other medical treatment and regimen         Other Orders:       2028F  Foot examination performed (includes examination through visual inspection, sensory exam with monofilament, and pulse exam - report when any of the three components are completed) (DM)4  (In-House)              ADDENDUMS:      ____________________________________    Addendum: 06/24/2021 05:46 PM - Paola Romero        Please add CC: Follow up Diabetes    AC

## 2021-05-18 NOTE — PROGRESS NOTES
Axel Simeon ZANE  1960     Office/Outpatient Visit    Visit Date: Wed, Dec 18, 2019 05:22 pm    Provider: Paola Romero N.P. (Assistant: Jennifer Ramos RN)    Location: [facility]        Electronically signed by Paola Romero N.P. on  12/19/2019 05:42:45 PM                             Subjective:        CC: Mr. Simeon is a 59 year old White male.  Diabetes follow up/Establish care with new PCP         HPI: Axel is here to follow up on diabetes type 2. Was diagnosed 1 1/2 years ago. HgbA1C back on 9/5/19 was up to 11.9. Last check was 8.0 11/22/19. Currently on Janumet twice daily. Also taking Humalog 75/25 10 units twice daily. Reports that readings have been running around 110s -120s typically. None over 120. Has met with the dietician in the past. Has been eating better. Doing exercise at [employer]. On Lisinopril 5 mg daily. Last foot exam 11/22/19. Was previously on Simvastatin. Does not remember why he stopped and no longer on statin. He is due for eye exam.    ROS:     CONSTITUTIONAL:  Negative for chills and fever.      CARDIOVASCULAR:  Negative for chest pain and palpitations.      RESPIRATORY:  Negative for dyspnea and frequent wheezing.      GASTROINTESTINAL:  Negative for abdominal pain and vomiting.      NEUROLOGICAL:  Negative for dizziness and headaches.      PSYCHIATRIC:  Negative for depression and suicidal thoughts.          Past Medical History / Family History / Social History:         Last Reviewed on 12/18/2019 05:47 PM by Paola Romero    Past Medical History:             PAST MEDICAL HISTORY         Fracture(s): right ankle;     Type 2 Diabetes         PREVENTIVE HEALTH MAINTENANCE             COLORECTAL CANCER SCREENING: Up to date (colonoscopy q10y; sigmoidoscopy q5y; Cologuard q3y) was last done 11/21/16, Results are in chart; colonoscopy with normal results; Dr Mcginnis     EYE EXAM: appt February - Roselia and Sean         Surgical History:          "Positive for    intestine removal for diverticulitis in 2009- Dr. Aguilar;;         Family History:     Father: Type 2 Diabetes         Social History:     Occupation: [employer] in [place]     Marital Status:      Children: 3 children         Tobacco/Alcohol/Supplements:     Last Reviewed on 12/18/2019 05:27 PM by Jennifer Ramos    Tobacco: He has never smoked.          Alcohol: Frequency: Once a month         Substance Abuse History:     Last Reviewed on 11/22/2019 04:29 PM by Craolina Owen    None         Mental Health History:     Last Reviewed on 11/22/2019 04:29 PM by Carolina Owen        Communicable Diseases (eg STDs):     Last Reviewed on 11/22/2019 04:29 PM by Carolina Owen        Current Problems:     Last Reviewed on 11/22/2019 04:29 PM by Carolina Owen    Meckel's diverticulum    Essential (primary) hypertension    Hypertension    Type 2 diabetes    Type 2 diabetes mellitus with unspecified complications    Encounter for screening for lipoid disorders    Screening for hyperlipidemia    Encounter for screening for malignant neoplasm of colon    Screening for cancer of colon    Obesity, unspecified    Dietary counseling and surveillance    Obesity, NOS    Hyperlipidemia    Hyperlipidemia, unspecified        Immunizations:     None        Allergies:     Last Reviewed on 12/18/2019 05:22 PM by Jennifer Ramos    No Known Allergies.        Current Medications:     Last Reviewed on 11/22/2019 04:29 PM by Carolina Owen    Glucose Reagent Blood Test Strips  Reagent Strips [ contour strips BID to QID testing daily  Diag 250.00]    Contour Meter  Kit [use with otis contour test strips  Diag 250.00]    Janumet XR 50mg/1,000mg Tablets, Extended Release [Take 1 tablet(s) by mouth twice daily with meals]    Novofine 32 32 gauge x 1/4\" needle  [Use to inject daily Dx E11.9 or ins approved]    HumaLOG Mix 75-25 KwikPen 100 unit/mL (75-25) subcutaneous Insulin Pen [inject 10 units BID]        " Objective:        Vitals:         Historical:     11/22/2019  BP:   134/79 mm Hg ( (right arm, , sitting, );) 11/22/2019  P:   70bpm ( (right arm (BP Cuff), , sitting, );) 11/22/2019  Wt:   217.2lbs    Current: 12/18/2019 5:30:28 PM    Ht:  5 ft, 11.5 in;  Wt: 217.2 lbs;  BMI: 29.9T: 97.5 F (oral);  BP: 138/94 mm Hg (right arm, sitting);  P: 76 bpm (right arm (BP Cuff), sitting);  sCr: 0.97 mg/dL;  GFR: 87.39        Repeat:     5:30:54 PM  BP:   121/80mm Hg (right arm, sitting, pulse-75)     Exams:     PHYSICAL EXAM:     GENERAL: well developed, well nourished;  no apparent distress;     RESPIRATORY: normal respiratory rate and pattern with no distress; normal breath sounds with no rales, rhonchi, wheezes or rubs;     CARDIOVASCULAR: normal rate; rhythm is regular;     MUSCULOSKELETAL: normal gait;     NEUROLOGIC: mental status: alert and oriented x 3; GROSSLY INTACT     PSYCHIATRIC: appropriate affect and demeanor; normal thought and perception;         Lab/Test Results:         Hemoglobin A1c: 7.4 (12/18/2019),     Performed by:: erendira (12/18/2019),             Assessment:         E11.8   Type 2 diabetes mellitus with unspecified complications       I10   Essential (primary) hypertension       E78.5   Hyperlipidemia, unspecified           ORDERS:         Meds Prescribed:       [Recorded] lisinopril 5 mg oral tablet [take 1 tablet (5 mg) by oral route once daily]       [New Rx] pravastatin 10 mg oral tablet [take 1 tablet (10 mg) by oral route once daily], #30 (thirty) tablets, Refills: 2 (two)         Radiology/Test Orders:       3017F  Colorectal CA screen results documented and reviewed (PV)  (In-House)              Lab Orders:       [id number]*  Hgb A1c fast lab  (In-House)            APPTO  Appointment need  (In-House)              Other Orders:       DLEYE  Dilated Eye Exam  (Send-Out)                      Plan:         Type 2 diabetes mellitus with unspecified complicationsContinue current medications. Will add  Pravastatin to reduce risk for CV events. Due for eye exam. Has appt in February.    LABORATORY:  Labs ordered to be performed today include Hgb A1c inhouse fast lab.  MIPS Vaccines Flu and Pneumonia updated in Shot record Colorectal Cancer Screening is up to date and the results are in the chart     FOLLOW-UP: Schedule a follow-up visit in 2 months.:Patient will call to schedule follow-up appointment     RECOMMENDATIONS: instructed in use of glucometer ( check glucose daily at random intervals ), adherance to Low carb, NCS diet diet, urine microalbumin test yearly, annual monofilament test for evaluating sensation in feet, daily foot self-inspection, annual eye exams, and need for yearly flu shots.            Prescriptions:       [Recorded] lisinopril 5 mg oral tablet [take 1 tablet (5 mg) by oral route once daily]       [New Rx] pravastatin 10 mg oral tablet [take 1 tablet (10 mg) by oral route once daily], #30 (thirty) tablets, Refills: 2 (two)           Orders:       [id number]*  Hgb A1c fast lab  (In-House)            DLEYE  Dilated Eye Exam  (Send-Out)            3017F  Colorectal CA screen results documented and reviewed (PV)  (In-House)            APPTO  Appointment need  (In-House)              Essential (primary) hypertensionStable        Hyperlipidemia, unspecifiedAdding statin.             Patient Recommendations:        For  Type 2 diabetes mellitus with unspecified complications:    Obtain instruction in the proper use of a home blood glucose monitor. Follow a diabetic diet as directed. Have your urine regularly tested to check for protein, which is an early sign of diabetic kidney problems. Get this urine protein test done every year. Have an annual monofilament test to check for diabetes-related sensory nerve disturbance in the feet. Examine your feet daily.  Small cuts and injuries often go unnoticed and can lead to serious infections. Have an annual eye exam. Obtain a flu shot each year.  Schedule a  follow-up visit in 2 months.              Charge Capture:         Primary Diagnosis:     E11.8  Type 2 diabetes mellitus with unspecified complications           Orders:      [id number]  Office/outpatient visit; established patient, level 4 (41 minutes)  (In-House)            66080*  Hgb A1c fast lab  (In-House)            3017F  Colorectal CA screen results documented and reviewed (PV)  (In-House)            APPTO  Appointment need  (In-House)              I10  Essential (primary) hypertension     E78.5  Hyperlipidemia, unspecified

## 2021-05-18 NOTE — PROGRESS NOTES
Axel Simeon  1960     Office/Outpatient Visit    Visit Date: Fri, Nov 22, 2019 04:20 pm    Provider: Carolina Owen N.P. (Assistant: Alyssa Root MA)    Location: [facility]        Electronically signed by Carolina Owen N.P. on  12/04/2019 02:54:35 PM                             Subjective:        CC: (PT STATES HE IS DOING 10U OF HUMALOG BID)Mr. Simeon is a 59 year old White male.  This is a follow-up visit.          HPI:           Patient presents with type 2 diabetes mellitus with unspecified complications.  Patient states he is take the 10 units and his blood sugar is better than he can ever remember but discouraged because he is gaining weight.  He states sugars are down under 140's and have been as low as 90. He states the worst part is he feels like he is starving so he is eating more and has gained like 10 pounds. He also states he is seeing a chiropractor for sciatica but since he has started the medicine it hasn't been helping as much.    ROS:     CONSTITUTIONAL:  Positive for unintentional weight gain ( 10 lbs pounds ).   Negative for chills, fatigue or fever.      E/N/T:  Negative for hearing problems, E/N/T pain, congestion, rhinorrhea, epistaxis, hoarseness, and dental problems.      RESPIRATORY:  Negative for cough, dyspnea, and hemoptysis.      GASTROINTESTINAL:  Negative for abdominal pain, heartburn, constipation, diarrhea, and stool changes.      MUSCULOSKELETAL:  Negative for arthralgias, back pain, and myalgias.      PSYCHIATRIC:  Negative for anxiety, depression, and sleep disturbances.          Past Medical History / Family History / Social History:         Last Reviewed on 11/22/2019 04:29 PM by Carolina Owen    Past Medical History:         Fracture(s): right ankle;         PREVENTIVE HEALTH MAINTENANCE             COLONOSCOPY: Done within the last 10 years was last done 11-27-16 with normal results         Surgical History:     NONE          "Family History:     Unremarkable         Social History:     Occupation: supervisor     Marital Status:      Children: 3 children     Mr. Simeon denies any current form of exercise.          Tobacco/Alcohol/Supplements:     Last Reviewed on 11/22/2019 04:29 PM by Carolina Owen    Tobacco: He has never smoked.          Alcohol: Frequency: Once a month     Caffeine:  He admits to consuming caffeine via soda ( 2 servings per day ).          Substance Abuse History:     Last Reviewed on 11/22/2019 04:29 PM by Carolina Owen    None         Mental Health History:     Last Reviewed on 11/22/2019 04:29 PM by Carolina Owen        Communicable Diseases (eg STDs):     Last Reviewed on 11/22/2019 04:29 PM by Carolina Owen        Current Problems:     Last Reviewed on 11/22/2019 04:29 PM by Carolina Owen    Essential (primary) hypertension    Type 2 diabetes mellitus with unspecified complications    Encounter for screening for lipoid disorders    Encounter for screening for malignant neoplasm of colon    Obesity, unspecified    Dietary counseling and surveillance    Hyperlipidemia, unspecified        Immunizations:     None        Allergies:     Last Reviewed on 11/22/2019 04:29 PM by Carolina Owen    No Known Allergies.        Current Medications:     Last Reviewed on 11/22/2019 04:29 PM by Carolina Owen    Lisinopril 5mg Tablet [1 tab daily]    Glucose Reagent Blood Test Strips  Reagent Strips [ contour strips BID to QID testing daily  Diag 250.00]    Contour Meter  Kit [use with otis contour test strips  Diag 250.00]    Janumet XR 50mg/1,000mg Tablets, Extended Release [Take 1 tablet(s) by mouth twice daily with meals]    Novofine Needle 32G x 6mm (1/4\")  Pen Needle [Use to inject daily Dx E11.9 or ins approved]    Humalog Mix  75%/25% Pen System, Disposable [Inject 6 unit before breakfast and 6 units before dinner]        Objective:        Vitals:         Current: 11/22/2019 4:25:11 " PM    Ht:  5 ft, 11.5 in;  Wt: 217.2 lbs;  BMI: 29.9T: 97.6 F (oral);  BP: 134/79 mm Hg (right arm, sitting);  P: 70 bpm (right arm (BP Cuff), sitting);  sCr: 0.97 mg/dL;  GFR: 87.39        Exams:     PHYSICAL EXAM:     GENERAL:  well developed and nourished; appropriately groomed; in no apparent distress;     RESPIRATORY:  lungs clear to auscultation and percussion; symmetric expansion; no dyspnea;     CARDIOVASCULAR: regular rate and rhythm; normal S1, S2; no murmur, rub, or gallop; normal PMI;     MUSCULOSKELETAL:  Normal range of motion, strength and tone;     PSYCHIATRIC:  appropriate affect and demeanor; normal speech pattern; grossly normal memory;     Left foot exam    Protective sensation using Monofilament test: NORMAL sensation. Patient detects .07 grams of force which is considered normal.    Vascular status: normal peripheral vascular exam with palpable dorsal pedal and posterior tibal pulses and brisk digital capillary refill    Skin is intact without sores or ulcers    Right foot exam    Protective sensation using Monofilament test: NORMAL sensation. Patient detects .07 grams of force which is considered normal.    Vascular status: normal peripheral vascular exam with palpable dorsal pedal and posterior tibal pulses and brisk digital capillary refill    Skin is intact without sores or ulcers         Lab/Test Results:         Hemoglobin A1c: 8.0 (11/22/2019),     Performed by:: kali (11/22/2019),             Assessment:         E11.8   Type 2 diabetes mellitus with unspecified complications           ORDERS:         Lab Orders:       [id number]*  Hgb A1c fast lab  (In-House)              Procedures Ordered:       REFER  Referral to Specialist or Other Facility  (Send-Out)            96835  Collection of capillary blood specimen (eg, finger, heel, ear stick)  (In-House)              Other Orders:       2028F  Foot examination performed (includes examination through visual inspection, sensory exam with  monofilament, and pulse exam - report when any of the three components are completed) (DM)4  (In-House)                      Plan:         Type 2 diabetes mellitus with unspecified complications    LABORATORY:  Labs ordered to be performed today include Hgb A1c inhouse fast lab.      REFERRALS:  Referral initiated to Diabetic Educator and Dietitian.            Orders:       [id number]*  Hgb A1c fast lab  (In-House)            REFER  Referral to Specialist or Other Facility  (Send-Out)            13524  Collection of capillary blood specimen (eg, finger, heel, ear stick)  (In-House)                  Other Orders      2028F  Foot examination performed (includes examination through visual inspection, sensory exam with monofilament, and pulse exam - report when any of the three components are completed) (DM)4  (In-House)              Charge Capture:         Primary Diagnosis:     E11.8  Type 2 diabetes mellitus with unspecified complications           Orders:      [id number]  Office/outpatient visit; established patient, level 3  (In-House)            10929*  Hgb A1c fast lab  (In-House)            17993  Collection of capillary blood specimen (eg, finger, heel, ear stick)  (In-House)                  Other Orders:       2028F  Foot examination performed (includes examination through visual inspection, sensory exam with monofilament, and pulse exam - report when any of the three components are completed) (DM)4  (In-House)

## 2021-05-18 NOTE — PROGRESS NOTES
Axel Simeon 1960     Office/Outpatient Visit    Visit Date: Tue, Oct 29, 2019 04:30 pm    Provider: Carolina Owen N.P. (Assistant: Alyssa Root MA)    Location: [facility]        Electronically signed by Carolina Owen N.P. on  11/09/2019 08:55:51 AM                             SUBJECTIVE:        CC:     Mr. Simeon is a 59 year old White male.  This is a follow-up visit.          HPI:         Mr. Simeon presents with type 2 diabetes.  Pt states blood sugar levels have been 150, 168, 136, 141, 149, 167 after eating, 182 am, 247 after eating, 143, 136, 149, 181, 154, 155, 122, 140, 172, 119, 139 this am. This was strting from 10/19/19. States prior to this it was in the 190's.  He states seeing a lot of improvement in numbers. Feeling well. He increased his insullin from 6units bid to 8 units.     ROS:     CONSTITUTIONAL:  Negative for chills, fatigue, fever and weight change.      CARDIOVASCULAR:  Negative for chest pain, orthopnea, paroxysmal nocturnal dyspnea and pedal edema.      RESPIRATORY:  Negative for dyspnea and cough.      GASTROINTESTINAL:  Negative for abdominal pain, heartburn, constipation, diarrhea, and stool changes.      PSYCHIATRIC:  Negative for anxiety and depression.          PMH/FMH/SH:     Last Reviewed on 10/04/2019 05:06 PM by Carolina Owen    Past Medical History:         Fracture(s): right ankle;         PREVENTIVE HEALTH MAINTENANCE             COLONOSCOPY: Done within the last 10 years was last done 11-27-16 with normal results         Surgical History:     NONE         Family History:     Unremarkable         Social History:     Occupation: supervisor     Marital Status:      Children: 3 children     Mr. Simeon denies any current form of exercise.          Tobacco/Alcohol/Supplements:     Last Reviewed on 10/04/2019 05:06 PM by Carolina Owen    Tobacco: He has never smoked.          Alcohol: Frequency: Once a month      "Caffeine:  He admits to consuming caffeine via soda ( 2 servings per day ).          Substance Abuse History:     Last Reviewed on 10/04/2019 05:06 PM by Carolina Owen    None         Mental Health History:     Last Reviewed on 10/04/2019 05:06 PM by Carolina Owen        Communicable Diseases (eg STDs):     Last Reviewed on 10/04/2019 05:06 PM by Carolina Owen            Current Problems:     Last Reviewed on 10/04/2019 05:06 PM by Carolina Owen    Hyperlipidemia     Obesity, NOS     Screening for cancer of colon     Screening for hyperlipidemia     Type 2 diabetes     Meckel's diverticulum     Hypertension     Screening for depression         Immunizations:     None        Allergies:     Last Reviewed on 10/04/2019 05:06 PM by Carolina Owen      No Known Drug Allergies.         Current Medications:     Last Reviewed on 10/04/2019 05:06 PM by Carolina Owen    Janumet XR 50mg/1,000mg Tablets, Extended Release Take 1 tablet(s) by mouth twice daily with meals     Glucose Reagent Blood Test Strips  Reagent Strips contour strips BID to QID testing daily  Diag 250.00     Sam Contour Meter (Blood Glucose Meter) Kit use with sam contour test strips  Diag 250.00  #100 (One [place]) kit(s)     Humalog Mix  75%/25% Pen System, Disposable Inject 6 unit before breakfast and 6 units before dinner     Novofine Needle 32G x 6mm (1/4\")  Pen Needle Use to inject daily Dx E11.9 or ins approved     Lisinopril 5mg Tablet 1 tab daily         OBJECTIVE:        Vitals:         Current: 10/29/2019 4:37:20 PM    Ht:  5 ft, 11.5 in;  Wt: 214.2 lbs;  BMI: 29.5    T: 97.7 F (oral);  BP: 120/70 mm Hg (right arm, sitting);  P: 74 bpm (right arm (BP Cuff), sitting);  sCr: 0.97 mg/dL;  GFR: 86.87        Exams:     PHYSICAL EXAM:     GENERAL: Vitals recorded well developed, well nourished;  well groomed;  no apparent distress;     EYES: lids and lacrimal system are normal in appearance; extraocular movements intact; " conjunctiva and cornea are normal; PERRLA;     NECK:  supple, full ROM; no thyromegaly; no carotid bruits;     RESPIRATORY: normal respiratory rate and pattern with no distress; normal breath sounds with no rales, rhonchi, wheezes or rubs;     CARDIOVASCULAR: normal rate; rhythm is regular;  normal S1; normal S2; no systolic murmur; no cyanosis; no edema;     GASTROINTESTINAL: nontender, nondistended; no hepatosplenomegaly or masses; no bruits;     SKIN:  no significant rashes or lesions; no suspicious moles;     MUSCULOSKELETAL:  Normal range of motion, strength and tone;     NEUROLOGICAL:  cranial nerves, motor and sensory function, reflexes, gait and coordination are all intact;     PSYCHIATRIC:  appropriate affect and demeanor; normal speech pattern; grossly normal memory;         Lab/Test Results:             Hemoglobin A1c:  9.1 (10/29/2019),     Performed by::  kali (10/29/2019),             ASSESSMENT:           250.00   E11.8  Type 2 diabetes              DDx:         ORDERS:         Lab Orders:       [id number]*  Hgb A1c fast lab  (In-House)           Procedures Ordered:       [id number]  Collection of capillary blood specimen (eg, finger, heel, ear stick)  (In-House)                   PLAN: A1C improved at 9.1.          Type 2 diabetes           Orders:       [id number]  Collection of capillary blood specimen (eg, finger, heel, ear stick)  (In-House)         [id number]*  Hgb A1c fast lab  (In-House)               CHARGE CAPTURE:           Primary Diagnosis:     250.00 Type 2 diabetes            E11.8    Type 2 diabetes mellitus with unspecified complications              Orders:          74911   Office/outpatient visit; established patient, level 3  (In-House)             36580   Collection of capillary blood specimen (eg, finger, heel, ear stick)  (In-House)             25249*   Hgb A1c fast lab  (In-House)

## 2021-05-18 NOTE — PROGRESS NOTES
Axel Simeon 1960     Office/Outpatient Visit    Visit Date: Tue, Dec 4, 2018 09:34 am    Provider: Carolina Owen N.P. (Assistant: Rubia Pedro MA)    Location: [facility]        Electronically signed by Carolina Owen N.P. on  12/07/2018 04:39:24 PM                             SUBJECTIVE:        CC: Pt also seen by AGUSTIN Quintana NP Student     Mr. Simeon is a 58 year old White male.  physical exam Patient presents today for physical exam         HPI:         Patient complains of health checkup.  His last physical exam was 2 years ago.  A chest x-ray was done several years ago years ago and it was normal.   His last ECG was 1 year ago, several years ago for work physical years ago, and was normal.   A hearing test was done 1 1/2 years ago during work physical years ago.   He's had vision screening done in August 2018.  He does not perform regular testicular self-exams.  Depression screen is performed and is negative.  He is not current with his pneumococcal and influenza immunization.      Smoking Status:  Nonsmoker         Concerning hypertension, reports occassional bp checks at home with  systolic readings 140-150 and diastolic 80-90;  States he does not take b/p medication r/t it making him feel bad.  No RX currently;         With regard to the type 2 diabetes, reports checking BG about once every two weeks with readings in the 180's;  States taking Janumet daily as prescribed;  Feeling well;     ROS:     CONSTITUTIONAL:  Negative for chills, fatigue, fever and weight change.      EYES:  Negative for blurred vision, eye pain, and photophobia.      E/N/T:  Negative for hearing problems, E/N/T pain, congestion, rhinorrhea, epistaxis, hoarseness, and dental problems.      CARDIOVASCULAR:  Negative for chest pain, orthopnea, paroxysmal nocturnal dyspnea and pedal edema.      RESPIRATORY:  Negative for dyspnea and cough.      GASTROINTESTINAL:  Negative for abdominal pain,  heartburn, constipation, diarrhea, and stool changes.      GENITOURINARY:  Negative for dysuria, genital lesions, hematuria, impotence, polyuria, and changes in urine stream.      MUSCULOSKELETAL:  Positive for pain to back of neck-seeing Chiropractor for treatment.      INTEGUMENTARY:  Negative for atypical moles, dry skin, pruritis, and rashes.      PSYCHIATRIC:  Negative for anxiety and depression.          PMH/FMH/SH:     Last Reviewed on 12/04/2018 01:02 PM by Carolina Owen    Past Medical History:         Fracture(s): right ankle;         PREVENTIVE HEALTH MAINTENANCE             COLONOSCOPY: Done within the last 10 years was last done 11-27-16 with normal results         Surgical History:     NONE         Family History:     Unremarkable         Social History:     Occupation: supervisor     Marital Status:      Children: 3 children     Mr. Simeon denies any current form of exercise.          Tobacco/Alcohol/Supplements:     Last Reviewed on 12/04/2018 01:02 PM by Carolina Owen    Tobacco:  Nonsmoker (never smoked);         Alcohol: Frequency: Once a month     Caffeine:  He admits to consuming caffeine via soda ( 2 servings per day ).          Substance Abuse History:     Last Reviewed on 12/04/2018 01:02 PM by Carolina Owen    None         Mental Health History:     Last Reviewed on 12/04/2018 01:02 PM by Carolina Owen        Communicable Diseases (eg STDs):     Last Reviewed on 12/04/2018 01:02 PM by Carolina Owen            Current Problems:     Last Reviewed on 12/04/2018 01:02 PM by Carolina Owen    Obesity, NOS     Screening for cancer of colon     Screening for hyperlipidemia     Type 2 diabetes     Meckel's diverticulum     Hypertension         Immunizations:     None        Allergies:     Last Reviewed on 12/04/2018 01:02 PM by Carolina Owen      No Known Drug Allergies.         Current Medications:     Last Reviewed on 12/04/2018 01:02 PM by Carolina Owen     Janumet XR 50mg/1,000mg Tablets, Extended Release Take 1 tablet(s) by mouth twice daily with meals     Glucose Reagent Blood Test Strips  Reagent Strips contour strips BID to QID testing daily  Diag 250.00     Sam Contour Meter (Blood Glucose Meter) Kit use with sam contour test strips  Diag 250.00  #100 (One [place]) kit(s)         OBJECTIVE:        Vitals:         Current: 12/4/2018 9:38:39 AM    Ht:  5 ft, 11.5 in;  Wt: 219.2 lbs;  BMI: 30.1    T: 97.3 F (oral);  BP: 152/87 mm Hg (left arm, sitting);  P: 61 bpm (left arm (BP Cuff), sitting);  sCr: 0.93 mg/dL;  GFR: 92.60        Exams:     PHYSICAL EXAM:     GENERAL: Vitals recorded well developed, well nourished;  well groomed;  no apparent distress;     EYES: lids and lacrimal system are normal in appearance; extraocular movements intact; conjunctiva and cornea are normal; PERRLA;     E/N/T:  normal EACs, TMs, nasal/oral mucosa, teeth, gingiva, and oropharynx;     NECK:  supple, full ROM; no thyromegaly; no carotid bruits;     RESPIRATORY: normal respiratory rate and pattern with no distress; normal breath sounds with no rales, rhonchi, wheezes or rubs;     CARDIOVASCULAR: normal rate; rhythm is regular;  normal S1 and S2 with no S3/S4 gallop, rubs or clicks; no systolic murmur; no cyanosis; no edema;     GASTROINTESTINAL: nontender, nondistended; no hepatosplenomegaly or masses; no bruits;     SKIN:  no significant rashes or lesions; no suspicious moles;     MUSCULOSKELETAL:  Normal range of motion, strength and tone;     NEUROLOGICAL:  cranial nerves, motor and sensory function, reflexes, gait and coordination are all intact;     PSYCHIATRIC:  appropriate affect and demeanor; normal speech pattern; grossly normal memory;     Left foot exam    Protective sensation using Monofilament test: NORMAL sensation. Patient detects .07 grams of force which is considered normal.    Vascular status: normal peripheral vascular exam with palpable dorsal pedal and  posterior tibal pulses and brisk digital capillary refill    Skin is intact without sores or ulcers    Right foot exam    Protective sensation using Monofilament test: NORMAL sensation. Patient detects .07 grams of force which is considered normal.    Vascular status: normal peripheral vascular exam with palpable dorsal pedal and posterior tibal pulses and brisk digital capillary refill    Skin is intact without sores or ulcers         ASSESSMENT:           V70.0   Z00.00  Health checkup              DDx:     401.1   I10  Hypertension              DDx:     250.00   E11.8  Type 2 diabetes              DDx:     V76.49   Z12.11  Screening for colon cancer              DDx:     278.00   E66.9   Z71.3  Obesity, NOS              DDx:         ORDERS:         Radiology/Test Orders:       3017F  Colorectal CA screen results documented and reviewed (PV)  (In-House)         2022F  Dilated retinal eye exam w/interpret by ophthalmologist/optometrist documented/reviewed (DM)4  (In-House)           Lab Orders:       99041  BDCBC [facility] CBC with 3 part diff  (Send-Out)         82433  HTNLP - [facility] CMP AND LIPID: [id number], [id number]  (Send-Out)         23304  A1CEG - [facility] Hemoglobin A1C  (Send-Out)         *  PRSAS Medicare screening PSA  (Send-Out)         28165  TSH - [facility] TSH  (Send-Out)         74001  VEGA [facility] Microablbumin, quantitative  (Send-Out)           Other Orders:       2028F  Foot examination performed (includes examination through visual inspection, sensory exam with monofi  (In-House)           Calculated BMI above the upper parameter and a follow-up plan was documented in the medical record  (In-House)           Negative EtOH screen  (In-House)                   PLAN:          Health checkup     MIPS PHQ-9 Depression Screening Completed form scanned and in chart; Total Score 0 Negative alcohol screen     COLORECTAL CANCER SCREENING: Results are in chart     BMI Elevated - Follow-Up Plan: He was provided  education on weight loss strategies     FOLLOW-UP:.   Patient already scheduled for follow-up appointment with PCP           Orders:       3017F  Colorectal CA screen results documented and reviewed (PV)  (In-House)           Negative EtOH screen  (In-House)         2022F  Dilated retinal eye exam w/interpret by ophthalmologist/optometrist documented/reviewed (DM)4  (In-House)            Hypertension     LABORATORY:  Labs ordered to be performed today include CBC and HTN/Lipid Panel: CMP, Lipid.            Orders:       77451  BDCBC - [facility] CBC with 3 part diff  (Send-Out)         77214  HTNLP - [facility] CMP AND LIPID: [id number], [id number]  (Send-Out)            Type 2 diabetes     LABORATORY:  Labs ordered to be performed today include HgbA1C, Microalbumin, and TSH.            Orders:       87065  A1CEG - [facility] Hemoglobin A1C  (Send-Out)         92910  TSH - [facility] TSH  (Send-Out)         54159  VEGA [facility] Microablbumin, quantitative  (Send-Out)            Screening for colon cancer     LABORATORY:  Labs ordered to be performed today include PSA.            [facility]:       [facility]  [facility]            Obesity, NOS     MIPS     BMI Elevated - Follow-Up Plan: He was provided education on weight loss strategies           Orders:         Calculated BMI above the upper parameter and a follow-up plan was documented in the medical record  (In-House)             Patient Education Handouts:       Obesity - Health Risks of Being Overweight   PTC              Other Orders:       2028F  Foot examination performed (includes examination through visual inspection, sensory exam with monofi  (In-House)           Patient Recommendations:        For  Health checkup:                     APPOINTMENT INFORMATION:        Monday Tuesday Wednesday Thursday Friday Saturday Sunday            Time:___________________AM  PM   Date:_____________________             CHARGE CAPTURE:           Primary Diagnosis:     V70.0  Health checkup            Z00.00    Encounter for general adult medical examination without abnormal findings              Orders:          13314   Preventive medicine, established patient, age 40-64 years  (In-House)             3017F   Colorectal CA screen results documented and reviewed (PV)  (In-House)                Negative EtOH screen  (In-House)             2022F   Dilated retinal eye exam w/interpret by ophthalmologist/optometrist documented/reviewed (DM)4  (In-House)           401.1 Hypertension            I10    Essential (primary) hypertension              Orders:          26086 -25  Office/outpatient visit; established patient, level 3  (In-House)           250.00 Type 2 diabetes            E11.8    Type 2 diabetes mellitus with unspecified complications    V76.49 Screening for colon cancer            Z12.11    Encounter for screening for malignant neoplasm of colon    278.00 Obesity, NOS            E66.9    Obesity, unspecified           Z71.3    Dietary counseling and surveillance              Orders:             Calculated BMI above the upper parameter and a follow-up plan was documented in the medical record  (In-House)               Other Orders:           2028F   Foot examination performed (includes examination through visual inspection, sensory exam with monofi  (In-House)

## 2021-05-18 NOTE — PROGRESS NOTES
Axel Simeon 1960     Office/Outpatient Visit    Visit Date: Thu, Sep 5, 2019 04:12 pm    Provider: Carolina Owen N.P. (Assistant: Italo Upton)    Location: [facility]        Electronically signed by Carolina Owen N.P. on  09/05/2019 07:13:56 PM                             SUBJECTIVE:        CC:     Mr. Simeon is a 58 year old White male.  This is a follow-up visit.  (NOT TAKING JANUMET)         HPI:         PHQ-9 Depression Screening: Completed form scanned and in chart; Total Score 0         In regard to the hypertension, pt states noticing some elevated BP's similar to today's readings.          In regard to the type 2 diabetes, pt states going to pharmacy and they had janumet ready. He told them he didn't need it, just the victoza. He was confused and thought he was suppose to stop janumet since he had started victoza. Pt states blood sugars have been in the 190's. However, states feeling good. Last A1C in February was 9.5.      ROS:     CONSTITUTIONAL:  Negative for chills, fatigue and fever.      CARDIOVASCULAR:  Negative for chest pain, orthopnea, paroxysmal nocturnal dyspnea and pedal edema.      RESPIRATORY:  Negative for dyspnea and cough.      GASTROINTESTINAL:  Negative for abdominal pain, heartburn, constipation, diarrhea, and stool changes.      MUSCULOSKELETAL:  Negative for arthralgias, back pain, and myalgias.      NEUROLOGICAL:  Negative for dizziness, headaches, paresthesias, and weakness.      PSYCHIATRIC:  Negative for anxiety and depression.          PMH/FMH/SH:     Last Reviewed on 9/05/2019 04:39 PM by Carolina Owen    Past Medical History:         Fracture(s): right ankle;         PREVENTIVE HEALTH MAINTENANCE             COLONOSCOPY: Done within the last 10 years was last done 11-27-16 with normal results         Surgical History:     NONE         Family History:     Unremarkable         Social History:     Occupation: supervisor     Marital  Status:      Children: 3 children     Mr. Simeon denies any current form of exercise.          Tobacco/Alcohol/Supplements:     Last Reviewed on 9/05/2019 04:39 PM by Carolina Owen    Tobacco:  Nonsmoker (never smoked);         Alcohol: Frequency: Once a month     Caffeine:  He admits to consuming caffeine via soda ( 2 servings per day ).          Substance Abuse History:     Last Reviewed on 9/05/2019 04:39 PM by Carolina Owen    None         Mental Health History:     Last Reviewed on 9/05/2019 04:39 PM by Carolina Owen        Communicable Diseases (eg STDs):     Last Reviewed on 9/05/2019 04:39 PM by Carolina Owen            Current Problems:     Last Reviewed on 9/05/2019 04:39 PM by Carolina Owen    Hyperlipidemia     Obesity, NOS     Screening for cancer of colon     Screening for hyperlipidemia     Type 2 diabetes     Meckel's diverticulum     Hypertension     Screening for depression         Immunizations:     None        Allergies:     Last Reviewed on 9/05/2019 04:39 PM by Carolina Owen      No Known Drug Allergies.         Current Medications:     Last Reviewed on 9/05/2019 04:39 PM by Carolina Owen    Janumet XR 50mg/1,000mg Tablets, Extended Release Take 1 tablet(s) by mouth twice daily with meals     Glucose Reagent Blood Test Strips  Reagent Strips contour strips BID to QID testing daily  Diag 250.00     Sam Contour Meter (Blood Glucose Meter) Kit use with sam contour test strips  Diag 250.00  #100 (One [place]) kit(s)     Liraglutide 18mg/3ml Injection Inject 0.6 mg daily for 2 weeks, then increase to 1.2 mg daily for 2 weeks, then increase to 1.8 mg daily.         OBJECTIVE:        Vitals:         Current: 9/5/2019 4:18:58 PM    Ht:  5 ft, 11.5 in;  Wt: 208.8 lbs;  BMI: 28.7    T: 98.2 F (oral);  BP: 144/94 mm Hg (left arm, sitting);  P: 70 bpm (left arm (BP Cuff), sitting);  sCr: 0.83 mg/dL;  GFR: 101.63        Repeat:     4:19:45 PM     BP:   140/100mm Hg  (right arm, sitting)     4:19:53 PM     P:   69bpm (left arm (BP Cuff), sitting)         Exams:     PHYSICAL EXAM:     GENERAL: Vitals recorded well developed, well nourished;  well groomed;  no apparent distress;     EYES: lids and lacrimal system are normal in appearance; extraocular movements intact; conjunctiva and cornea are normal; PERRLA;     NECK:  supple, full ROM; no thyromegaly; no carotid bruits;     RESPIRATORY: normal respiratory rate and pattern with no distress; normal breath sounds with no rales, rhonchi, wheezes or rubs;     CARDIOVASCULAR: normal rate; rhythm is regular;  normal S1; normal S2; no systolic murmur; no cyanosis; no edema;     GASTROINTESTINAL: nontender, nondistended; no hepatosplenomegaly or masses; no bruits;     SKIN:  no significant rashes or lesions; no suspicious moles;     MUSCULOSKELETAL:  Normal range of motion, strength and tone;     NEUROLOGICAL:  cranial nerves, motor and sensory function, reflexes, gait and coordination are all intact;     PSYCHIATRIC:  appropriate affect and demeanor; normal speech pattern; grossly normal memory;         ASSESSMENT:           V79.0   Z13.31  Screening for depression              DDx:     401.1   I10  Hypertension              DDx:     250.00   E11.8  Type 2 diabetes              DDx:         ORDERS:         Meds Prescribed:       Refill of: Janumet XR (Sitagliptin/Metformin HCl) 50mg/1,000mg Tablets, Extended Release Take 1 tablet(s) by mouth twice daily with meals  #180 (One [place]) tablet(s) Refills: 1       Lisinopril 5mg Tablet 1 tab daily  #90 (Ninety) tablet(s) Refills: 1         Lab Orders:       [id number]  [facility]  ([facility]         559[facility]  [facility] CMP, CBC,LIPID, PRSAS-, TSH 60325, 99346, 66704, 74856, , 06690  (Send-Out)                   PLAN: Pt has DM eye exam scheduled. CLN 3 years ago.          Hypertension     LABORATORY:  Labs ordered to be performed today include  HgbA1C and MALE PHYSICAL: CMP, CBC, LIPID, PSA, TSH.            Prescriptions:       Lisinopril 5mg Tablet 1 tab daily  #90 (Ninety) tablet(s) Refills: 1           Orders:       97632  A1C - [facility] Hemoglobin A1C  (Send-Out)         39353  [facility] CMP, CBC,LIPID, PRSAS-, TSH 12743, 18865, 58367, 76119, , 63917  (Send-Out)            Type 2 diabetes           Prescriptions:       Refill of: Janumet XR (Sitagliptin/Metformin HCl) 50mg/1,000mg Tablets, Extended Release Take 1 tablet(s) by mouth twice daily with meals  #180 (One [place] [employer] [place]) tablet(s) Refills: 1             CHARGE CAPTURE:           Primary Diagnosis:     V79.0 Screening for depression            Z13.31    Encounter for screening for depression              Orders:          39604   Office/outpatient visit; established patient, level 4  (In-House)           401.1 Hypertension            I10    Essential (primary) hypertension    250.00 Type 2 diabetes            E11.8    Type 2 diabetes mellitus with unspecified complications

## 2021-05-18 NOTE — PROGRESS NOTES
Axel Simeon ZANE  1960     Office/Outpatient Visit    Visit Date: Wed, Nov 4, 2020 10:12 am    Provider: Paola Romero N.P. (Assistant: Jennifer Ramos, RN)    Location: [facility]        Electronically signed by Paola Romero N.P. on  11/04/2020 10:57:52 AM                             Subjective:        CC: Mr. Simeon is a 60 year old White male.  Positive covid- needs something for cough ([employer]gr[employer] VIDEO- [phone]) Today's encounter is being done with a telehealth visit. He has consented verbally with two witnesses for todays treatment. Todays visit is being conducted by audio and video. Individuals present during the telemedicine consultation include Jennifer Ramos RN         HPI: Axel reports that he was diagnosed on 10/27/2020 with covid19 at [facility]. Symptoms include fatigue, cough. Denies fever currently but had low grade fever initially. Has been taking Delsym, Bromfed DM. Has been checking oxygen level at home.          Mr. Simeon presents with type 2 diabetes mellitus with unspecified complications.  Current meds include an oral hypoglycemic ( Janumet ), insulin/injectable ( Humulin 70/30 ),  Prinivil, and lipid lowering agent ( Pravachol ).  Most recent lab results include Total Cholesterol:  229 (mg/dL) (09/05/2019), HDL:  52 (mg/dL) (09/05/2019), Triglycerides:  238 (mg/dL) (09/05/2019), LDL:  129 (mg/dL) (09/05/2019), Hemoglobin A1c:  7.4 (12/18/2019).      ROS:     CONSTITUTIONAL:  Positive for fatigue and fever.      EYES:  Negative for blurred vision and eye drainage.      E/N/T:  Negative for ear pain and sore throat.      CARDIOVASCULAR:  Negative for chest pain and palpitations.      RESPIRATORY:  Positive for recent cough and dyspnea ( with heavy exertion ).      GASTROINTESTINAL:  Negative for abdominal pain and vomiting.          Past Medical History / Family History / Social History:         Last Reviewed on 12/18/2019 05:47 PM by Paola Romero     Past Medical History:             PAST MEDICAL HISTORY         Fracture(s): right ankle;     Type 2 Diabetes         PREVENTIVE HEALTH MAINTENANCE             COLORECTAL CANCER SCREENING: Up to date (colonoscopy q10y; sigmoidoscopy q5y; Cologuard q3y) was last done 11/21/16, Results are in chart; colonoscopy with normal results; Dr Mcginnis     EYE EXAM: appt February - Christos     PSA: was last done 9/5/19 with normal results         Surgical History:         Positive for    intestine removal for diverticulitis in 2009- Dr. 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          Alcohol: Frequency: Once a month         Substance Abuse History:     Last Reviewed on 11/22/2019 04:29 PM by Carolina Owen         Mental Health History:     Last Reviewed on 11/22/2019 04:29 PM by Carolina Owen        Communicable Diseases (eg STDs):     Last Reviewed on 11/22/2019 04:29 PM by Carolina Owen        Current Problems:     Last Reviewed on 11/22/2019 04:29 PM by Carolina Owen    Meckel's diverticulum    Essential (primary) hypertension    Hypertension    Type 2 diabetes    Type 2 diabetes mellitus with unspecified complications    Encounter for screening for lipoid disorders    Screening for hyperlipidemia    Encounter for screening for malignant neoplasm of colon    Screening for cancer of colon    Obesity, unspecified    Dietary counseling and surveillance    Obesity, NOS    Hyperlipidemia    Hyperlipidemia, unspecified        Immunizations:     None        Allergies:     Last Reviewed on 12/18/2019 05:22 PM by Jennifer Ramos    No Known Allergies.        Current Medications:     Last Reviewed on 11/22/2019 04:29 PM by  Carolina Owen    Glucose Reagent Blood Test Strips  Reagent Strips [ contour strips BID to QID testing daily  Diag 250.00]    Contour Meter  Kit [use with otis contour test strips  Diag 250.00]    Janumet XR 50-1,000 mg oral Tablet, Extended Release Multiphase 24 hr [Take 1 tablet(s) by mouth twice daily with meals]    HumaLOG Mix 75-25 KwikPen 100 unit/mL (75-25) subcutaneous Insulin Pen [INJECT 10 UNITS SUBCUTANEOUSLY TWICE DAILY]    pravastatin 10 mg oral tablet [Take 1 tablet by mouth once daily]    lisinopriL 5 mg oral tablet [take 1 tablet (5 mg) by oral route once daily]        Objective:        Exams:     PHYSICAL EXAM:     GENERAL: well developed, well nourished;  no apparent distress;     RESPIRATORY: normal appearance and symmetric expansion of chest wall; normal respiratory rate and pattern with no distress;     NEUROLOGIC: mental status: alert and oriented x 3;     PSYCHIATRIC: appropriate affect and demeanor;         Assessment:         B34.2   Coronavirus infection, unspecified       E11.8   Type 2 diabetes mellitus with unspecified complications       Z12.5   Encounter for screening for malignant neoplasm of prostate           ORDERS:         Meds Prescribed:       [New Rx] promethazine-DM 6.25-15 mg/5 mL oral Syrup [take 5 milliliters by oral route every 4 hours as needed], #120 (one hundred and twenty) milliliters, Refills: 0 (zero)       [New Rx] Zithromax 250 mg oral tablet [take 2 tablets (500 mg) by oral route once daily for 1 day then 1 tablet (250 mg) by oral route once daily for 4 days], #6 (six) tablets, Refills: 0 (zero)       [Refilled] HumaLOG Mix 75-25 KwikPen 100 unit/mL (75-25) subcutaneous Insulin Pen [INJECT 10 UNITS SUBCUTANEOUSLY TWICE DAILY], #15 (fifteen) milliliters, Refills: 0 (zero)       [New Rx] albuterol sulfate 90 mcg/actuation Inhalation HFA Aerosol Inhaler [inhale 1 - 2 puffs (90 - 180 mcg) by inhalation route every 4 hours as needed], #9 (nine) grams, Refills: 1  (one)         Radiology/Test Orders:       3017F  Colorectal CA screen results documented and reviewed (PV)  (In-House)              Lab Orders:       [id number]  DIAB - [facility] CMP A1C LIPID AND MICRO ALBUM CR RATIO: [id number]  (Send-Out)            *  PRSAS Medicare screening PSA  (Send-Out)              Other Orders:       DLEYE  Dilated Eye Exam  (Send-Out)                      Plan:         Coronavirus infection, unspecifiedDiscussed quarantine for 10 days since tested as well as no fever for 72 hours and symptoms improved for 72 hours. Will cover for PNA with antibiotics. ER precautions given.         RECOMMENDATIONS given include: Push Fluids, Rest, Follow up if no improvement or worsening symptoms like high fevers, vomiting, weakness, or increasing shortness of air.    .  MIPS Vaccines Flu and Pneumonia updated in Shot record Colorectal Cancer Screening is up to date and the results are in the chart           Prescriptions:       [New Rx] promethazine-DM 6.25-15 mg/5 mL oral Syrup [take 5 milliliters by oral route every 4 hours as needed], #120 (one hundred and twenty) milliliters, Refills: 0 (zero)       [New Rx] Zithromax 250 mg oral tablet [take 2 tablets (500 mg) by oral route once daily for 1 day then 1 tablet (250 mg) by oral route once daily for 4 days], #6 (six) tablets, Refills: 0 (zero)       [New Rx] albuterol sulfate 90 mcg/actuation Inhalation HFA Aerosol Inhaler [inhale 1 - 2 puffs (90 - 180 mcg) by inhalation route every 4 hours as needed], #9 (nine) grams, Refills: 1 (one)           Orders:       3017F  Colorectal CA screen results documented and reviewed (PV)  (In-House)              Type 2 diabetes mellitus with unspecified complicationsNo refills needed on Janumet XR, Lisinopril, Pravastatin at this time. Will come in for fasting labs after coronavirus symptoms improve and no longer in quarantine.    LABORATORY:  Labs ordered to be performed today include Diabetes Panel  2;CMP, A1C, Lipid, Microalbumin:Creatinine Ratio.      RECOMMENDATIONS: instructed in use of glucometer ( check glucose daily at random intervals ), adherance to Low carb, NCS diet diet, urine microalbumin test yearly, annual monofilament test for evaluating sensation in feet, daily foot self-inspection, annual eye exams, and need for yearly flu shots.            Prescriptions:       [Refilled] HumaLOG Mix 75-25 KwikPen 100 unit/mL (75-25) subcutaneous Insulin Pen [INJECT 10 UNITS SUBCUTANEOUSLY TWICE DAILY], #15 (fifteen) milliliters, Refills: 0 (zero)           Orders:       [id number]  DIAB - [facility] CMP A1C LIPID AND MICRO ALBUM CR RATIO: [id number]  (Send-Out)            DLEYE  Dilated Eye Exam  (Send-Out)              Encounter for screening for malignant neoplasm of prostate    LABORATORY:  Labs ordered to be performed today include PSA.            Orders:       *  PRSAS Medicare screening PSA  (Send-Out)                  Patient Recommendations:        For  Type 2 diabetes mellitus with unspecified complications:    Obtain instruction in the proper use of a home blood glucose monitor. Follow a diabetic diet as directed. Have your urine regularly tested to check for protein, which is an early sign of diabetic kidney problems. Get this urine protein test done every year. Have an annual monofilament test to check for diabetes-related sensory nerve disturbance in the feet. Examine your feet daily.  Small cuts and injuries often go unnoticed and can lead to serious infections. Have an annual eye exam. Obtain a flu shot each year.              Charge Capture:         Primary Diagnosis:     B34.2  Coronavirus infection, unspecified           Orders:      [id number]  Office/outpatient visit; established patient, level 4  (In-House)            3017F  Colorectal CA screen results documented and reviewed (PV)  (In-House)              E11.8  Type 2 diabetes mellitus with unspecified complications     Z12.5   Encounter for screening for malignant neoplasm of prostate

## 2021-07-01 VITALS
SYSTOLIC BLOOD PRESSURE: 140 MMHG | DIASTOLIC BLOOD PRESSURE: 100 MMHG | TEMPERATURE: 98.2 F | BODY MASS INDEX: 28.28 KG/M2 | HEART RATE: 69 BPM | WEIGHT: 208.8 LBS | HEIGHT: 72 IN

## 2021-07-01 VITALS
SYSTOLIC BLOOD PRESSURE: 157 MMHG | TEMPERATURE: 97.4 F | WEIGHT: 223.2 LBS | HEART RATE: 107 BPM | BODY MASS INDEX: 30.23 KG/M2 | HEIGHT: 72 IN | DIASTOLIC BLOOD PRESSURE: 92 MMHG

## 2021-07-01 VITALS
TEMPERATURE: 98.3 F | HEART RATE: 88 BPM | SYSTOLIC BLOOD PRESSURE: 117 MMHG | HEIGHT: 72 IN | BODY MASS INDEX: 28.61 KG/M2 | WEIGHT: 211.2 LBS | DIASTOLIC BLOOD PRESSURE: 68 MMHG

## 2021-07-01 VITALS
TEMPERATURE: 97.5 F | DIASTOLIC BLOOD PRESSURE: 92 MMHG | HEIGHT: 72 IN | BODY MASS INDEX: 28.36 KG/M2 | SYSTOLIC BLOOD PRESSURE: 142 MMHG | HEART RATE: 75 BPM | WEIGHT: 209.4 LBS

## 2021-07-01 VITALS
HEIGHT: 72 IN | WEIGHT: 219.2 LBS | TEMPERATURE: 97.3 F | HEART RATE: 61 BPM | BODY MASS INDEX: 29.69 KG/M2 | SYSTOLIC BLOOD PRESSURE: 152 MMHG | DIASTOLIC BLOOD PRESSURE: 87 MMHG

## 2021-07-01 VITALS
SYSTOLIC BLOOD PRESSURE: 134 MMHG | TEMPERATURE: 97.6 F | WEIGHT: 217.2 LBS | HEIGHT: 72 IN | HEART RATE: 70 BPM | DIASTOLIC BLOOD PRESSURE: 79 MMHG | BODY MASS INDEX: 29.42 KG/M2

## 2021-07-01 VITALS
SYSTOLIC BLOOD PRESSURE: 121 MMHG | DIASTOLIC BLOOD PRESSURE: 80 MMHG | BODY MASS INDEX: 29.42 KG/M2 | WEIGHT: 217.2 LBS | HEIGHT: 72 IN | HEART RATE: 76 BPM | TEMPERATURE: 97.5 F

## 2021-07-01 VITALS
HEART RATE: 74 BPM | BODY MASS INDEX: 29.01 KG/M2 | WEIGHT: 214.2 LBS | DIASTOLIC BLOOD PRESSURE: 70 MMHG | TEMPERATURE: 97.7 F | SYSTOLIC BLOOD PRESSURE: 120 MMHG | HEIGHT: 72 IN

## 2021-07-01 VITALS
BODY MASS INDEX: 29.61 KG/M2 | DIASTOLIC BLOOD PRESSURE: 76 MMHG | HEIGHT: 72 IN | TEMPERATURE: 97.3 F | SYSTOLIC BLOOD PRESSURE: 141 MMHG | WEIGHT: 218.6 LBS | HEART RATE: 70 BPM

## 2021-07-02 VITALS
TEMPERATURE: 96.2 F | WEIGHT: 221.6 LBS | HEART RATE: 66 BPM | DIASTOLIC BLOOD PRESSURE: 88 MMHG | HEIGHT: 72 IN | BODY MASS INDEX: 30.02 KG/M2 | SYSTOLIC BLOOD PRESSURE: 139 MMHG

## 2021-08-02 ENCOUNTER — OFFICE VISIT (OUTPATIENT)
Dept: FAMILY MEDICINE CLINIC | Age: 61
End: 2021-08-02

## 2021-08-02 VITALS
TEMPERATURE: 97.9 F | DIASTOLIC BLOOD PRESSURE: 84 MMHG | WEIGHT: 220.4 LBS | HEIGHT: 72 IN | HEART RATE: 71 BPM | SYSTOLIC BLOOD PRESSURE: 125 MMHG | BODY MASS INDEX: 29.85 KG/M2

## 2021-08-02 DIAGNOSIS — E11.8 TYPE 2 DIABETES MELLITUS WITH UNSPECIFIED COMPLICATIONS (HCC): Primary | ICD-10-CM

## 2021-08-02 DIAGNOSIS — E78.2 MIXED HYPERLIPIDEMIA: ICD-10-CM

## 2021-08-02 DIAGNOSIS — Z11.59 SCREENING FOR VIRAL DISEASE: ICD-10-CM

## 2021-08-02 DIAGNOSIS — I10 ESSENTIAL (PRIMARY) HYPERTENSION: ICD-10-CM

## 2021-08-02 PROCEDURE — 99214 OFFICE O/P EST MOD 30 MIN: CPT | Performed by: NURSE PRACTITIONER

## 2021-08-02 RX ORDER — PRAVASTATIN SODIUM 10 MG
10 TABLET ORAL DAILY
Qty: 90 TABLET | Refills: 0 | Status: SHIPPED | OUTPATIENT
Start: 2021-08-02 | End: 2021-08-11 | Stop reason: SDUPTHER

## 2021-08-02 RX ORDER — LISINOPRIL 5 MG/1
5 TABLET ORAL DAILY
Qty: 90 TABLET | Refills: 0 | Status: SHIPPED | OUTPATIENT
Start: 2021-08-02 | End: 2022-02-23 | Stop reason: SDUPTHER

## 2021-08-02 RX ORDER — SITAGLIPTIN AND METFORMIN HYDROCHLORIDE 1000; 50 MG/1; MG/1
1 TABLET, FILM COATED, EXTENDED RELEASE ORAL 2 TIMES DAILY WITH MEALS
COMMUNITY
Start: 2021-05-07 | End: 2021-08-10

## 2021-08-02 RX ORDER — INSULIN LISPRO 100 [IU]/ML
10 INJECTION, SUSPENSION SUBCUTANEOUS 2 TIMES DAILY
COMMUNITY
Start: 2021-04-30 | End: 2021-08-10

## 2021-08-02 RX ORDER — PRAVASTATIN SODIUM 10 MG
10 TABLET ORAL DAILY
COMMUNITY
Start: 2021-04-30 | End: 2021-08-02 | Stop reason: SDUPTHER

## 2021-08-02 RX ORDER — LISINOPRIL 5 MG/1
5 TABLET ORAL DAILY
COMMUNITY
Start: 2021-04-30 | End: 2021-08-02 | Stop reason: SDUPTHER

## 2021-08-10 ENCOUNTER — LAB (OUTPATIENT)
Dept: LAB | Facility: HOSPITAL | Age: 61
End: 2021-08-10

## 2021-08-10 DIAGNOSIS — E11.8 TYPE 2 DIABETES MELLITUS WITH UNSPECIFIED COMPLICATIONS (HCC): ICD-10-CM

## 2021-08-10 DIAGNOSIS — Z11.59 SCREENING FOR VIRAL DISEASE: ICD-10-CM

## 2021-08-10 LAB
ALBUMIN SERPL-MCNC: 4.3 G/DL (ref 3.5–5.2)
ALBUMIN UR-MCNC: 1.3 MG/DL
ALBUMIN/GLOB SERPL: 2.3 G/DL
ALP SERPL-CCNC: 74 U/L (ref 39–117)
ALT SERPL W P-5'-P-CCNC: 16 U/L (ref 1–41)
ANION GAP SERPL CALCULATED.3IONS-SCNC: 10.4 MMOL/L (ref 5–15)
AST SERPL-CCNC: 20 U/L (ref 1–40)
BASOPHILS # BLD AUTO: 0.03 10*3/MM3 (ref 0–0.2)
BASOPHILS NFR BLD AUTO: 0.5 % (ref 0–1.5)
BILIRUB SERPL-MCNC: 0.4 MG/DL (ref 0–1.2)
BUN SERPL-MCNC: 22 MG/DL (ref 8–23)
BUN/CREAT SERPL: 16.9 (ref 7–25)
CALCIUM SPEC-SCNC: 8.8 MG/DL (ref 8.6–10.5)
CHLORIDE SERPL-SCNC: 104 MMOL/L (ref 98–107)
CHOLEST SERPL-MCNC: 207 MG/DL (ref 0–200)
CO2 SERPL-SCNC: 26.6 MMOL/L (ref 22–29)
CREAT SERPL-MCNC: 1.3 MG/DL (ref 0.76–1.27)
CREAT UR-MCNC: 259.5 MG/DL
DEPRECATED RDW RBC AUTO: 37.2 FL (ref 37–54)
EOSINOPHIL # BLD AUTO: 0.1 10*3/MM3 (ref 0–0.4)
EOSINOPHIL NFR BLD AUTO: 1.6 % (ref 0.3–6.2)
ERYTHROCYTE [DISTWIDTH] IN BLOOD BY AUTOMATED COUNT: 12.2 % (ref 12.3–15.4)
GFR SERPL CREATININE-BSD FRML MDRD: 56 ML/MIN/1.73
GLOBULIN UR ELPH-MCNC: 1.9 GM/DL
GLUCOSE SERPL-MCNC: 132 MG/DL (ref 65–99)
HBA1C MFR BLD: 7.2 % (ref 4.8–5.6)
HCT VFR BLD AUTO: 44.5 % (ref 37.5–51)
HCV AB SER DONR QL: NORMAL
HDLC SERPL-MCNC: 44 MG/DL (ref 40–60)
HGB BLD-MCNC: 15 G/DL (ref 13–17.7)
IMM GRANULOCYTES # BLD AUTO: 0.01 10*3/MM3 (ref 0–0.05)
IMM GRANULOCYTES NFR BLD AUTO: 0.2 % (ref 0–0.5)
LDLC SERPL CALC-MCNC: 119 MG/DL (ref 0–100)
LDLC/HDLC SERPL: 2.56 {RATIO}
LYMPHOCYTES # BLD AUTO: 1.35 10*3/MM3 (ref 0.7–3.1)
LYMPHOCYTES NFR BLD AUTO: 21.4 % (ref 19.6–45.3)
MCH RBC QN AUTO: 28.4 PG (ref 26.6–33)
MCHC RBC AUTO-ENTMCNC: 33.7 G/DL (ref 31.5–35.7)
MCV RBC AUTO: 84.3 FL (ref 79–97)
MICROALBUMIN/CREAT UR: 5 MG/G
MONOCYTES # BLD AUTO: 0.4 10*3/MM3 (ref 0.1–0.9)
MONOCYTES NFR BLD AUTO: 6.3 % (ref 5–12)
NEUTROPHILS NFR BLD AUTO: 4.43 10*3/MM3 (ref 1.7–7)
NEUTROPHILS NFR BLD AUTO: 70 % (ref 42.7–76)
PLATELET # BLD AUTO: 230 10*3/MM3 (ref 140–450)
PMV BLD AUTO: 10 FL (ref 6–12)
POTASSIUM SERPL-SCNC: 4 MMOL/L (ref 3.5–5.2)
PROT SERPL-MCNC: 6.2 G/DL (ref 6–8.5)
RBC # BLD AUTO: 5.28 10*6/MM3 (ref 4.14–5.8)
SODIUM SERPL-SCNC: 141 MMOL/L (ref 136–145)
TRIGL SERPL-MCNC: 252 MG/DL (ref 0–150)
VLDLC SERPL-MCNC: 44 MG/DL (ref 5–40)
WBC # BLD AUTO: 6.32 10*3/MM3 (ref 3.4–10.8)

## 2021-08-10 PROCEDURE — 80061 LIPID PANEL: CPT

## 2021-08-10 PROCEDURE — 36415 COLL VENOUS BLD VENIPUNCTURE: CPT

## 2021-08-10 PROCEDURE — 86803 HEPATITIS C AB TEST: CPT

## 2021-08-10 PROCEDURE — 80053 COMPREHEN METABOLIC PANEL: CPT

## 2021-08-10 PROCEDURE — 82043 UR ALBUMIN QUANTITATIVE: CPT

## 2021-08-10 PROCEDURE — 85025 COMPLETE CBC W/AUTO DIFF WBC: CPT

## 2021-08-10 PROCEDURE — 83036 HEMOGLOBIN GLYCOSYLATED A1C: CPT

## 2021-08-10 PROCEDURE — 82570 ASSAY OF URINE CREATININE: CPT

## 2021-08-10 RX ORDER — SITAGLIPTIN AND METFORMIN HYDROCHLORIDE 1000; 50 MG/1; MG/1
TABLET, FILM COATED, EXTENDED RELEASE ORAL
Qty: 180 TABLET | Refills: 0 | Status: SHIPPED | OUTPATIENT
Start: 2021-08-10 | End: 2022-02-23 | Stop reason: SDUPTHER

## 2021-08-10 RX ORDER — INSULIN LISPRO 100 [IU]/ML
INJECTION, SUSPENSION SUBCUTANEOUS
Qty: 15 ML | Refills: 0 | Status: SHIPPED | OUTPATIENT
Start: 2021-08-10 | End: 2021-11-03 | Stop reason: SDUPTHER

## 2021-08-11 DIAGNOSIS — E78.2 MIXED HYPERLIPIDEMIA: ICD-10-CM

## 2021-08-11 RX ORDER — PRAVASTATIN SODIUM 20 MG
20 TABLET ORAL DAILY
Qty: 90 TABLET | Refills: 0 | Status: SHIPPED | OUTPATIENT
Start: 2021-08-11 | End: 2022-09-07 | Stop reason: SDUPTHER

## 2021-11-03 ENCOUNTER — OFFICE VISIT (OUTPATIENT)
Dept: FAMILY MEDICINE CLINIC | Age: 61
End: 2021-11-03

## 2021-11-03 VITALS
DIASTOLIC BLOOD PRESSURE: 76 MMHG | OXYGEN SATURATION: 97 % | TEMPERATURE: 97.8 F | SYSTOLIC BLOOD PRESSURE: 120 MMHG | HEART RATE: 72 BPM | HEIGHT: 73 IN | BODY MASS INDEX: 29.4 KG/M2 | WEIGHT: 221.8 LBS

## 2021-11-03 DIAGNOSIS — Z00.00 ANNUAL PHYSICAL EXAM: Primary | ICD-10-CM

## 2021-11-03 DIAGNOSIS — E78.2 MIXED HYPERLIPIDEMIA: ICD-10-CM

## 2021-11-03 DIAGNOSIS — E11.8 TYPE 2 DIABETES MELLITUS WITH UNSPECIFIED COMPLICATIONS (HCC): ICD-10-CM

## 2021-11-03 DIAGNOSIS — I10 ESSENTIAL (PRIMARY) HYPERTENSION: ICD-10-CM

## 2021-11-03 PROCEDURE — 99396 PREV VISIT EST AGE 40-64: CPT | Performed by: NURSE PRACTITIONER

## 2021-11-03 RX ORDER — INSULIN LISPRO 100 [IU]/ML
10 INJECTION, SUSPENSION SUBCUTANEOUS 2 TIMES DAILY WITH MEALS
Qty: 15 ML | Refills: 1 | Status: SHIPPED | OUTPATIENT
Start: 2021-11-03 | End: 2022-02-23 | Stop reason: SDUPTHER

## 2021-11-03 NOTE — PROGRESS NOTES
Chief Complaint  Axel Simeon presents to [facility] for Annual Exam, Hypertension (3 MO FU ), Hyperlipidemia, and Type 2 DM    Subjective          History of Present Illness    Axel is here today for annual preventive exam. UTD colonoscopy.   Declines influenza, Tdap, PNA, zoster vaccine.   He has history of diabetes. Last A1C was 7.2. Currently on Humalog and Janumet. Also on Lisinopril. Has previously met with dietician/diabetes educator. Last eye exam March 2021. Last foot exam 4/2021.   Also with history of hyperlipidemia. Current medication includes pravastatin. Dose was increased after last labs but he has still been taking the 10 mg dose. He notes that he will start taking the 20 mg dose.      Review of Systems   Constitutional: Negative for chills and fever.   HENT: Negative for ear pain and sore throat.    Eyes: Negative for blurred vision and redness.   Respiratory: Negative for cough, shortness of breath and wheezing.    Cardiovascular: Negative for chest pain and palpitations.   Gastrointestinal: Negative for abdominal pain, constipation, diarrhea, nausea and vomiting.   Genitourinary: Negative for dysuria, frequency and urgency.   Musculoskeletal: Negative for back pain and joint swelling.   Skin: Negative for rash.   Neurological: Negative for dizziness and headache.   Psychiatric/Behavioral: Negative for suicidal ideas and depressed mood.          No Known Allergies   Past Medical History:   Diagnosis Date   • Coronavirus infection, unspecified    • Diverticulitis 2009    DR. GIORDANO   • Essential (primary) hypertension    • Hyperlipidemia, unspecified    • Type 2 diabetes mellitus with unspecified complications (HCC)      Current Outpatient Medications   Medication Sig Dispense Refill   • Insulin Lispro Prot & Lispro (HumaLOG Mix 75/25 KwikPen) (75-25) 100 UNIT/ML suspension pen-injector pen Inject 10 Units under the skin into the appropriate area as directed  "2 (Two) Times a Day With Meals. 15 mL 1   • Janumet XR  MG tablet TAKE 1 TABLET BY MOUTH TWICE DAILY WITH MEALS 180 tablet 0   • lisinopril (PRINIVIL,ZESTRIL) 5 MG tablet Take 1 tablet by mouth Daily. 90 tablet 0   • pravastatin (PRAVACHOL) 20 MG tablet Take 1 tablet by mouth Daily. 90 tablet 0     No current facility-administered medications for this visit.     Past Surgical History:   Procedure Laterality Date   • COLONOSCOPY      INTENSE REMOVAL FOR DIVERTICULITIS IN 2009       Social History     Tobacco Use   • Smoking status: Never Smoker   • Smokeless tobacco: Never Used   Vaping Use   • Vaping Use: Never used   Substance Use Topics   • Alcohol use: Yes     Comment: 1x monthly   • Drug use: Never     History reviewed. No pertinent family history.  Health Maintenance Due   Topic Date Due   • ANNUAL PHYSICAL  Never done   • INFLUENZA VACCINE  Never done      Immunization History   Administered Date(s) Administered   • COVID-19 ([place]) 08/13/2021        Objective     Vitals:    11/03/21 1530   BP: 120/76   Pulse: 72   Temp: 97.8 °F (36.6 °C)   SpO2: 97%   Weight: 101 kg (221 lb 12.8 oz)   Height: 185.4 cm (73\")     Body mass index is 29.26 kg/m².     Physical Exam  Vitals reviewed.   Constitutional:       General: He is not in acute distress.     Appearance: Normal appearance.   HENT:      Head: Normocephalic and atraumatic.      Right Ear: Hearing, tympanic membrane and ear canal normal.      Left Ear: Hearing, tympanic membrane and ear canal normal.      Mouth/Throat:      Mouth: Mucous membranes are moist.   Eyes:      Extraocular Movements: Extraocular movements intact.      Pupils: Pupils are equal, round, and reactive to light.   Cardiovascular:      Rate and Rhythm: Normal rate and regular rhythm.   Pulmonary:      Effort: Pulmonary effort is normal. No respiratory distress.      Breath sounds: Normal breath sounds.   Abdominal:      General: Bowel sounds are normal.      Palpations: " Abdomen is soft.      Tenderness: There is no abdominal tenderness.   Musculoskeletal:         General: Normal range of motion.      Cervical back: Normal range of motion and neck supple.   Skin:     General: Skin is warm and dry.   Neurological:      Mental Status: He is alert and oriented to person, place, and time.   Psychiatric:         Mood and Affect: Mood normal.           Result Review :     The following data was reviewed by: JITENDRA Osorio on 11/03/2021:          Microalbumin / Creatinine Urine Ratio - Urine, Clean Catch (08/10/2021 16:01)  Lipid panel (08/10/2021 16:01)  Hemoglobin A1c (08/10/2021 16:01)  Comprehensive metabolic panel (08/10/2021 16:01)  CBC and Differential (08/10/2021 16:01)  Hepatitis C Antibody (08/10/2021 16:01)                  Assessment and Plan      Diagnoses and all orders for this visit:    1. Annual physical exam (Primary)  Comments:  Counseled health maintenance recommendations. Will return for fasting labs prior to next appointment.   Orders:  -     Comprehensive metabolic panel; Future  -     Lipid panel; Future    2. Type 2 diabetes mellitus with unspecified complications (HCC)  Comments:  Continue efforts with diet and exercise.   Orders:  -     Insulin Lispro Prot & Lispro (HumaLOG Mix 75/25 KwikPen) (75-25) 100 UNIT/ML suspension pen-injector pen; Inject 10 Units under the skin into the appropriate area as directed 2 (Two) Times a Day With Meals.  Dispense: 15 mL; Refill: 1  -     Hemoglobin A1c; Future  -     Microalbumin / Creatinine Urine Ratio - Urine, Clean Catch; Future    3. Essential (primary) hypertension  Comments:  Stable. Continue Lisinopril 5 mg daily    4. Mixed hyperlipidemia  Comments:  Has been taking pravachol 10 mg daily. He will increase to 20 mg daily along with diet and exercise.               Follow Up     Return in about 3 months (around 2/3/2022) for Recheck.

## 2022-02-21 ENCOUNTER — LAB (OUTPATIENT)
Dept: LAB | Facility: HOSPITAL | Age: 62
End: 2022-02-21

## 2022-02-21 DIAGNOSIS — E11.8 TYPE 2 DIABETES MELLITUS WITH UNSPECIFIED COMPLICATIONS: ICD-10-CM

## 2022-02-21 DIAGNOSIS — Z00.00 ANNUAL PHYSICAL EXAM: ICD-10-CM

## 2022-02-21 LAB
ALBUMIN SERPL-MCNC: 4.3 G/DL (ref 3.5–5.2)
ALBUMIN/GLOB SERPL: 1.5 G/DL
ALP SERPL-CCNC: 88 U/L (ref 39–117)
ALT SERPL W P-5'-P-CCNC: 19 U/L (ref 1–41)
ANION GAP SERPL CALCULATED.3IONS-SCNC: 10.5 MMOL/L (ref 5–15)
AST SERPL-CCNC: 18 U/L (ref 1–40)
BILIRUB SERPL-MCNC: 0.5 MG/DL (ref 0–1.2)
BUN SERPL-MCNC: 19 MG/DL (ref 8–23)
BUN/CREAT SERPL: 15.6 (ref 7–25)
CALCIUM SPEC-SCNC: 9.4 MG/DL (ref 8.6–10.5)
CHLORIDE SERPL-SCNC: 101 MMOL/L (ref 98–107)
CHOLEST SERPL-MCNC: 221 MG/DL (ref 0–200)
CO2 SERPL-SCNC: 27.5 MMOL/L (ref 22–29)
CREAT SERPL-MCNC: 1.22 MG/DL (ref 0.76–1.27)
GFR SERPL CREATININE-BSD FRML MDRD: 60 ML/MIN/1.73
GLOBULIN UR ELPH-MCNC: 2.8 GM/DL
GLUCOSE SERPL-MCNC: 124 MG/DL (ref 65–99)
HBA1C MFR BLD: 10 % (ref 4.8–5.6)
HDLC SERPL-MCNC: 48 MG/DL (ref 40–60)
LDLC SERPL CALC-MCNC: 140 MG/DL (ref 0–100)
LDLC/HDLC SERPL: 2.85 {RATIO}
POTASSIUM SERPL-SCNC: 3.9 MMOL/L (ref 3.5–5.2)
PROT SERPL-MCNC: 7.1 G/DL (ref 6–8.5)
SODIUM SERPL-SCNC: 139 MMOL/L (ref 136–145)
TRIGL SERPL-MCNC: 182 MG/DL (ref 0–150)
VLDLC SERPL-MCNC: 33 MG/DL (ref 5–40)

## 2022-02-21 PROCEDURE — 83036 HEMOGLOBIN GLYCOSYLATED A1C: CPT

## 2022-02-21 PROCEDURE — 80053 COMPREHEN METABOLIC PANEL: CPT

## 2022-02-21 PROCEDURE — 82570 ASSAY OF URINE CREATININE: CPT

## 2022-02-21 PROCEDURE — 80061 LIPID PANEL: CPT

## 2022-02-21 PROCEDURE — 82043 UR ALBUMIN QUANTITATIVE: CPT

## 2022-02-21 PROCEDURE — 36415 COLL VENOUS BLD VENIPUNCTURE: CPT

## 2022-02-22 LAB
ALBUMIN UR-MCNC: 3 MG/DL
CREAT UR-MCNC: 311 MG/DL
MICROALBUMIN/CREAT UR: 9.6 MG/G

## 2022-02-23 ENCOUNTER — OFFICE VISIT (OUTPATIENT)
Dept: FAMILY MEDICINE CLINIC | Age: 62
End: 2022-02-23

## 2022-02-23 VITALS
TEMPERATURE: 97.7 F | HEIGHT: 73 IN | HEART RATE: 71 BPM | BODY MASS INDEX: 29.45 KG/M2 | DIASTOLIC BLOOD PRESSURE: 78 MMHG | WEIGHT: 222.2 LBS | SYSTOLIC BLOOD PRESSURE: 144 MMHG

## 2022-02-23 DIAGNOSIS — E11.8 TYPE 2 DIABETES MELLITUS WITH UNSPECIFIED COMPLICATIONS: Primary | ICD-10-CM

## 2022-02-23 DIAGNOSIS — M25.512 ACUTE PAIN OF LEFT SHOULDER: ICD-10-CM

## 2022-02-23 DIAGNOSIS — I10 ESSENTIAL (PRIMARY) HYPERTENSION: ICD-10-CM

## 2022-02-23 DIAGNOSIS — E78.2 MIXED HYPERLIPIDEMIA: ICD-10-CM

## 2022-02-23 PROCEDURE — 99214 OFFICE O/P EST MOD 30 MIN: CPT | Performed by: NURSE PRACTITIONER

## 2022-02-23 RX ORDER — LISINOPRIL 5 MG/1
5 TABLET ORAL DAILY
Qty: 90 TABLET | Refills: 1 | Status: SHIPPED | OUTPATIENT
Start: 2022-02-23 | End: 2022-12-14 | Stop reason: SDUPTHER

## 2022-02-23 RX ORDER — SITAGLIPTIN AND METFORMIN HYDROCHLORIDE 1000; 50 MG/1; MG/1
1 TABLET, FILM COATED, EXTENDED RELEASE ORAL 2 TIMES DAILY WITH MEALS
Qty: 180 TABLET | Refills: 1 | Status: SHIPPED | OUTPATIENT
Start: 2022-02-23 | End: 2022-12-14 | Stop reason: SDUPTHER

## 2022-02-23 RX ORDER — DICLOFENAC SODIUM 75 MG/1
75 TABLET, DELAYED RELEASE ORAL 2 TIMES DAILY
Qty: 60 TABLET | Refills: 1 | Status: SHIPPED | OUTPATIENT
Start: 2022-02-23 | End: 2022-04-24

## 2022-02-23 RX ORDER — INSULIN LISPRO 100 [IU]/ML
15 INJECTION, SUSPENSION SUBCUTANEOUS 2 TIMES DAILY WITH MEALS
Qty: 15 ML | Refills: 1 | Status: SHIPPED | OUTPATIENT
Start: 2022-02-23 | End: 2022-09-01

## 2022-02-23 NOTE — PROGRESS NOTES
Chief Complaint  Axel Simeon presents to [facility] for Diabetes (3 month f/u, f/u on bloodwork)    Subjective          History of Present Illness    Axel is following up on diabetes today. Recent A1C was 10.0. Currently on Humalog 25 units once daily and Janumet XR one tab BID. He notes that he has not been going to the gym and thinks that is why his numbers are up. Also on Lisinopril. Has previously met with dietician/diabetes educator. Last eye exam March 2021.   Also with history of hyperlipidemia. Current medication includes pravastatin. He thinks he may still be taking the 10 mg dose rather than the 20 mg dose.   Left shoulder pain. Started about one month ago. Worse with movement  And raising arm. Radiating to elbow. Applying heat. He works with heavy racks at work.     Review of Systems       No Known Allergies   Past Medical History:   Diagnosis Date   • Coronavirus infection, unspecified    • Diverticulitis 2009    DR. GIORDANO   • Essential (primary) hypertension    • Hyperlipidemia, unspecified    • Type 2 diabetes mellitus with unspecified complications (HCC)      Current Outpatient Medications   Medication Sig Dispense Refill   • Insulin Lispro Prot & Lispro (HumaLOG Mix 75/25 KwikPen) (75-25) 100 UNIT/ML suspension pen-injector pen Inject 15 Units under the skin into the appropriate area as directed 2 (Two) Times a Day With Meals. 15 mL 1   • lisinopril (PRINIVIL,ZESTRIL) 5 MG tablet Take 1 tablet by mouth Daily. 90 tablet 1   • pravastatin (PRAVACHOL) 20 MG tablet Take 1 tablet by mouth Daily. 90 tablet 0   • SITagliptin-metFORMIN HCl ER (Janumet XR)  MG tablet Take 1 tablet by mouth 2 (Two) Times a Day With Meals. 180 tablet 1   • diclofenac (VOLTAREN) 75 MG EC tablet Take 1 tablet by mouth 2 (Two) Times a Day for 60 days. 60 tablet 1     No current facility-administered medications for this visit.     Past Surgical History:   Procedure Laterality Date  "  • COLONOSCOPY      INTENSE REMOVAL FOR DIVERTICULITIS IN 2009       Social History     Tobacco Use   • Smoking status: Never Smoker   • Smokeless tobacco: Never Used   Vaping Use   • Vaping Use: Never used   Substance Use Topics   • Alcohol use: Yes     Comment: 1x monthly   • Drug use: Never     History reviewed. No pertinent family history.  Health Maintenance Due   Topic Date Due   • COVID-19 Vaccine (2 - Booster for Juan series) 10/08/2021      Immunization History   Administered Date(s) Administered   • COVID-19 ([facility]) 08/13/2021        Objective     Vitals:    02/23/22 1546   BP: 144/78   BP Location: Left arm   Patient Position: Sitting   Cuff Size: Large Adult   Pulse: 71   Temp: 97.7 °F (36.5 °C)   TempSrc: Oral   Weight: 101 kg (222 lb 3.2 oz)   Height: 185.4 cm (72.99\")     Body mass index is 29.32 kg/m².     Physical Exam  Vitals reviewed.   Constitutional:       General: He is not in acute distress.     Appearance: Normal appearance. He is well-developed.   HENT:      Head: Normocephalic and atraumatic.   Cardiovascular:      Rate and Rhythm: Normal rate and regular rhythm.   Pulmonary:      Effort: Pulmonary effort is normal.      Breath sounds: Normal breath sounds.   Neurological:      Mental Status: He is alert and oriented to person, place, and time.   Psychiatric:         Mood and Affect: Mood and affect normal.           Result Review :     The following data was reviewed by: JITENDRA Osorio on 02/23/2022:          Microalbumin / Creatinine Urine Ratio - Urine, Clean Catch (02/21/2022 16:00)  Lipid panel (02/21/2022 16:00)  Hemoglobin A1c (02/21/2022 16:00)  Comprehensive metabolic panel (02/21/2022 16:00)                  Assessment and Plan      Diagnoses and all orders for this visit:    1. Type 2 diabetes mellitus with unspecified complications (HCC) (Primary)  Comments:  Get back on track with diet and exercise. Will start taking Humalog 75/25 BID. Continue Janumet XR. "   Orders:  -     Insulin Lispro Prot & Lispro (HumaLOG Mix 75/25 KwikPen) (75-25) 100 UNIT/ML suspension pen-injector pen; Inject 15 Units under the skin into the appropriate area as directed 2 (Two) Times a Day With Meals.  Dispense: 15 mL; Refill: 1  -     SITagliptin-metFORMIN HCl ER (Janumet XR)  MG tablet; Take 1 tablet by mouth 2 (Two) Times a Day With Meals.  Dispense: 180 tablet; Refill: 1    2. Essential (primary) hypertension  Comments:  Medical condition is stable.  Continue same therapy.  Will recheck at next regular appointment.  Orders:  -     lisinopril (PRINIVIL,ZESTRIL) 5 MG tablet; Take 1 tablet by mouth Daily.  Dispense: 90 tablet; Refill: 1    3. Mixed hyperlipidemia  Comments:  Increase pravastatin to 20 mg daily.     4. Acute pain of left shoulder  Comments:  Consider imaging if no improvement.   Orders:  -     diclofenac (VOLTAREN) 75 MG EC tablet; Take 1 tablet by mouth 2 (Two) Times a Day for 60 days.  Dispense: 60 tablet; Refill: 1              Follow Up     Return in about 3 months (around 5/23/2022) for Recheck.

## 2022-05-23 ENCOUNTER — OFFICE VISIT (OUTPATIENT)
Dept: FAMILY MEDICINE CLINIC | Age: 62
End: 2022-05-23

## 2022-05-23 VITALS
WEIGHT: 220 LBS | SYSTOLIC BLOOD PRESSURE: 121 MMHG | BODY MASS INDEX: 29.16 KG/M2 | DIASTOLIC BLOOD PRESSURE: 81 MMHG | HEART RATE: 69 BPM | TEMPERATURE: 97.8 F | HEIGHT: 73 IN

## 2022-05-23 DIAGNOSIS — I10 ESSENTIAL (PRIMARY) HYPERTENSION: ICD-10-CM

## 2022-05-23 DIAGNOSIS — E11.8 TYPE 2 DIABETES MELLITUS WITH UNSPECIFIED COMPLICATIONS: Primary | ICD-10-CM

## 2022-05-23 DIAGNOSIS — E78.2 MIXED HYPERLIPIDEMIA: ICD-10-CM

## 2022-05-23 LAB
EXPIRATION DATE: NORMAL
HBA1C MFR BLD: 9.2 %
Lab: NORMAL

## 2022-05-23 PROCEDURE — 99214 OFFICE O/P EST MOD 30 MIN: CPT | Performed by: NURSE PRACTITIONER

## 2022-05-23 PROCEDURE — 83036 HEMOGLOBIN GLYCOSYLATED A1C: CPT | Performed by: NURSE PRACTITIONER

## 2022-05-23 NOTE — ASSESSMENT & PLAN NOTE
Diabetes is improving with treatment.   Dietary recommendations for ADA diet.  Discussed foot care.   Will increase Humalog 75/25 to 15 units BID.   Diabetes will be reassessed in 3 months.

## 2022-05-23 NOTE — PROGRESS NOTES
Chief Complaint  Axel Simeon presents to [facility] for Diabetes (3 month follow up)    Subjective          History of Present Illness    Axel is following up on diabetes today. Last A1C was 10.0. Currently on Humalog 75/25 10 units BID and Janumet XR 50/1000 mg BID. Also on Lisinopril. Has previously met with dietician/diabetes educator. Due for eye exam. Plans to schedule. He notes that blood sugar readings have been good.   Also with hyperlipidemia. Current medication includes pravastatin 20 mg daily. No refills needed today.     Review of Systems      No Known Allergies   Past Medical History:   Diagnosis Date   • Coronavirus infection, unspecified    • Diverticulitis 2009    DR. GIORDANO   • Essential (primary) hypertension    • Hyperlipidemia, unspecified    • Type 2 diabetes mellitus with unspecified complications (HCC)      Current Outpatient Medications   Medication Sig Dispense Refill   • Insulin Lispro Prot & Lispro (HumaLOG Mix 75/25 KwikPen) (75-25) 100 UNIT/ML suspension pen-injector pen Inject 15 Units under the skin into the appropriate area as directed 2 (Two) Times a Day With Meals. 15 mL 1   • lisinopril (PRINIVIL,ZESTRIL) 5 MG tablet Take 1 tablet by mouth Daily. 90 tablet 1   • pravastatin (PRAVACHOL) 20 MG tablet Take 1 tablet by mouth Daily. 90 tablet 0   • SITagliptin-metFORMIN HCl ER (Janumet XR)  MG tablet Take 1 tablet by mouth 2 (Two) Times a Day With Meals. 180 tablet 1     No current facility-administered medications for this visit.     Past Surgical History:   Procedure Laterality Date   • COLONOSCOPY      INTENSE REMOVAL FOR DIVERTICULITIS IN 2009       Social History     Tobacco Use   • Smoking status: Never Smoker   • Smokeless tobacco: Never Used   Vaping Use   • Vaping Use: Never used   Substance Use Topics   • Alcohol use: Yes     Comment: 1x monthly   • Drug use: Never     History reviewed. No pertinent family  "history.  Health Maintenance Due   Topic Date Due   • DIABETIC EYE EXAM  03/06/2022      Immunization History   Administered Date(s) Administered   • COVID-19 (JUAN) 08/13/2021        Objective     Vitals:    05/23/22 1543 05/23/22 1545   BP: 142/88 121/81   BP Location: Left arm Right arm   Patient Position: Sitting Sitting   Pulse: 66 69   Temp: 97.8 °F (36.6 °C)    TempSrc: Oral    Weight: 99.8 kg (220 lb)    Height: 185.4 cm (72.99\")      Body mass index is 29.03 kg/m².     Physical Exam  Vitals reviewed.   Constitutional:       General: He is not in acute distress.     Appearance: Normal appearance. He is well-developed.   HENT:      Head: Normocephalic and atraumatic.   Cardiovascular:      Rate and Rhythm: Normal rate and regular rhythm.      Pulses:           Dorsalis pedis pulses are 2+ on the right side and 2+ on the left side.   Pulmonary:      Effort: Pulmonary effort is normal.      Breath sounds: Normal breath sounds.   Feet:      Right foot:      Protective Sensation: 3 sites tested. 3 sites sensed.      Skin integrity: Skin integrity normal. No ulcer or blister.      Toenail Condition: Right toenails are normal.      Left foot:      Protective Sensation: 3 sites tested. 3 sites sensed.      Skin integrity: Skin integrity normal. No ulcer or blister.      Toenail Condition: Left toenails are normal.      Comments: Diabetic Foot Exam Performed and Monofilament Test Performed     Neurological:      Mental Status: He is alert and oriented to person, place, and time.   Psychiatric:         Mood and Affect: Mood and affect normal.           Result Review :     The following data was reviewed by: JITENDRA Osorio on 05/23/2022:          POC Glycosylated Hemoglobin (Hb A1C) (05/23/2022 15:51)                  Assessment and Plan      Diagnoses and all orders for this visit:    1. Type 2 diabetes mellitus with unspecified complications (HCC) (Primary)  Assessment & Plan:  Diabetes is improving with " treatment.   Dietary recommendations for ADA diet.  Discussed foot care.   Will increase Humalog 75/25 to 15 units BID.   Diabetes will be reassessed in 3 months.    Orders:  -     POC Glycosylated Hemoglobin (Hb A1C)    2. Essential (primary) hypertension  Assessment & Plan:  Medical condition is stable.  Continue same therapy.  Will recheck at next regular appointment        3. Mixed hyperlipidemia  Assessment & Plan:  Medical condition is stable.  Continue same therapy.  Will recheck at next regular appointment                Follow Up     Return in about 3 months (around 8/23/2022) for Recheck.

## 2022-09-01 DIAGNOSIS — E11.8 TYPE 2 DIABETES MELLITUS WITH UNSPECIFIED COMPLICATIONS: ICD-10-CM

## 2022-09-01 RX ORDER — INSULIN LISPRO 100 [IU]/ML
INJECTION, SUSPENSION SUBCUTANEOUS
Qty: 15 ML | Refills: 0 | Status: SHIPPED | OUTPATIENT
Start: 2022-09-01 | End: 2022-09-07 | Stop reason: SDUPTHER

## 2022-09-07 ENCOUNTER — OFFICE VISIT (OUTPATIENT)
Dept: FAMILY MEDICINE CLINIC | Age: 62
End: 2022-09-07

## 2022-09-07 VITALS
BODY MASS INDEX: 29.16 KG/M2 | DIASTOLIC BLOOD PRESSURE: 86 MMHG | HEART RATE: 72 BPM | SYSTOLIC BLOOD PRESSURE: 129 MMHG | HEIGHT: 73 IN | TEMPERATURE: 97.8 F | WEIGHT: 220 LBS

## 2022-09-07 DIAGNOSIS — E11.8 TYPE 2 DIABETES MELLITUS WITH UNSPECIFIED COMPLICATIONS: ICD-10-CM

## 2022-09-07 DIAGNOSIS — E78.2 MIXED HYPERLIPIDEMIA: ICD-10-CM

## 2022-09-07 PROCEDURE — 99214 OFFICE O/P EST MOD 30 MIN: CPT | Performed by: NURSE PRACTITIONER

## 2022-09-07 RX ORDER — PRAVASTATIN SODIUM 20 MG
20 TABLET ORAL DAILY
Qty: 90 TABLET | Refills: 0 | Status: SHIPPED | OUTPATIENT
Start: 2022-09-07 | End: 2022-12-14 | Stop reason: SDUPTHER

## 2022-09-07 RX ORDER — INSULIN LISPRO 100 [IU]/ML
15 INJECTION, SUSPENSION SUBCUTANEOUS 2 TIMES DAILY WITH MEALS
Qty: 40 ML | Refills: 0 | Status: SHIPPED | OUTPATIENT
Start: 2022-09-07 | End: 2022-12-14 | Stop reason: SDUPTHER

## 2022-09-07 NOTE — PROGRESS NOTES
Chief Complaint  Axel Simeon presents to [facility] for Diabetes (3 month follow up )    Subjective          History of Present Illness     Axel is following up on diabetes today. Last A1C was 9.2. Currently on Humalog 75/25 15 units BID and Janumet XR 50/1000 mg BID. Also on Lisinopril. Has previously met with dietician/diabetes educator. Due for eye exam. Plans to schedule. Has not been checking his blood sugar much at home.   Also with hyperlipidemia. Current medication includes pravastatin 20 mg daily.        Review of Systems      No Known Allergies   Past Medical History:   Diagnosis Date   • Coronavirus infection, unspecified    • Diverticulitis 2009    DR. GIORDANO   • Essential (primary) hypertension    • Hyperlipidemia, unspecified    • Type 2 diabetes mellitus with unspecified complications (HCC)      Current Outpatient Medications   Medication Sig Dispense Refill   • Insulin Lispro Prot & Lispro (HumaLOG Mix 75/25 KwikPen) (75-25) 100 UNIT/ML suspension pen-injector pen Inject 15 Units under the skin into the appropriate area as directed 2 (Two) Times a Day With Meals. 40 mL 0   • lisinopril (PRINIVIL,ZESTRIL) 5 MG tablet Take 1 tablet by mouth Daily. 90 tablet 1   • pravastatin (PRAVACHOL) 20 MG tablet Take 1 tablet by mouth Daily. 90 tablet 0   • SITagliptin-metFORMIN HCl ER (Janumet XR)  MG tablet Take 1 tablet by mouth 2 (Two) Times a Day With Meals. 180 tablet 1     No current facility-administered medications for this visit.     Past Surgical History:   Procedure Laterality Date   • COLONOSCOPY      INTENSE REMOVAL FOR DIVERTICULITIS IN 2009       Social History     Tobacco Use   • Smoking status: Never Smoker   • Smokeless tobacco: Never Used   Vaping Use   • Vaping Use: Never used   Substance Use Topics   • Alcohol use: Yes     Comment: 1x monthly   • Drug use: Never     History reviewed. No pertinent family history.  Health Maintenance Due  "  Topic Date Due   • DIABETIC EYE EXAM  03/06/2022      Immunization History   Administered Date(s) Administered   • COVID-19 (JUAN) 08/13/2021        Objective     Vitals:    09/07/22 1537   BP: 129/86   BP Location: Left arm   Patient Position: Sitting   Pulse: 72   Temp: 97.8 °F (36.6 °C)   TempSrc: Oral   Weight: 99.8 kg (220 lb)   Height: 185.4 cm (72.99\")     Body mass index is 29.03 kg/m².     Physical Exam  Vitals reviewed.   Constitutional:       General: He is not in acute distress.     Appearance: Normal appearance. He is well-developed.   HENT:      Head: Normocephalic and atraumatic.   Cardiovascular:      Rate and Rhythm: Normal rate and regular rhythm.   Pulmonary:      Effort: Pulmonary effort is normal.      Breath sounds: Normal breath sounds.   Neurological:      Mental Status: He is alert and oriented to person, place, and time.   Psychiatric:         Mood and Affect: Mood and affect normal.           Result Review :     The following data was reviewed by: JITENDRA Osorio on 09/07/2022:          POC Glycosylated Hemoglobin (Hb A1C) (05/23/2022 16:13)  Microalbumin / Creatinine Urine Ratio - Urine, Clean Catch (02/21/2022 16:00)  Lipid panel (02/21/2022 16:00)  Hemoglobin A1c (02/21/2022 16:00)  Comprehensive metabolic panel (02/21/2022 16:00)                  Assessment and Plan      Diagnoses and all orders for this visit:    1. Type 2 diabetes mellitus with unspecified complications (HCC)  Comments:  Rechecking labs. Continue efforts with diabetic diet and exercise.   Orders:  -     Insulin Lispro Prot & Lispro (HumaLOG Mix 75/25 KwikPen) (75-25) 100 UNIT/ML suspension pen-injector pen; Inject 15 Units under the skin into the appropriate area as directed 2 (Two) Times a Day With Meals.  Dispense: 40 mL; Refill: 0  -     Comprehensive metabolic panel; Future  -     Hemoglobin A1c; Future    2. Mixed hyperlipidemia  Comments:  Medical condition is stable.  Continue same therapy.  Will " recheck at next regular appointment.  Orders:  -     pravastatin (PRAVACHOL) 20 MG tablet; Take 1 tablet by mouth Daily.  Dispense: 90 tablet; Refill: 0              Follow Up     Return in about 3 months (around 12/7/2022) for Annual physical.

## 2022-09-19 ENCOUNTER — TELEPHONE (OUTPATIENT)
Dept: FAMILY MEDICINE CLINIC | Age: 62
End: 2022-09-19

## 2022-10-11 ENCOUNTER — LAB (OUTPATIENT)
Dept: LAB | Facility: HOSPITAL | Age: 62
End: 2022-10-11

## 2022-10-11 DIAGNOSIS — E11.8 TYPE 2 DIABETES MELLITUS WITH UNSPECIFIED COMPLICATIONS: ICD-10-CM

## 2022-10-11 PROCEDURE — 80053 COMPREHEN METABOLIC PANEL: CPT

## 2022-10-11 PROCEDURE — 83036 HEMOGLOBIN GLYCOSYLATED A1C: CPT

## 2022-10-11 PROCEDURE — 36415 COLL VENOUS BLD VENIPUNCTURE: CPT

## 2022-10-12 LAB
ALBUMIN SERPL-MCNC: 3.9 G/DL (ref 3.5–5.2)
ALBUMIN/GLOB SERPL: 1.7 G/DL
ALP SERPL-CCNC: 95 U/L (ref 39–117)
ALT SERPL W P-5'-P-CCNC: 19 U/L (ref 1–41)
ANION GAP SERPL CALCULATED.3IONS-SCNC: 8 MMOL/L (ref 5–15)
AST SERPL-CCNC: 18 U/L (ref 1–40)
BILIRUB SERPL-MCNC: 0.3 MG/DL (ref 0–1.2)
BUN SERPL-MCNC: 19 MG/DL (ref 8–23)
BUN/CREAT SERPL: 16.7 (ref 7–25)
CALCIUM SPEC-SCNC: 9.3 MG/DL (ref 8.6–10.5)
CHLORIDE SERPL-SCNC: 99 MMOL/L (ref 98–107)
CO2 SERPL-SCNC: 30 MMOL/L (ref 22–29)
CREAT SERPL-MCNC: 1.14 MG/DL (ref 0.76–1.27)
EGFRCR SERPLBLD CKD-EPI 2021: 73.2 ML/MIN/1.73
GLOBULIN UR ELPH-MCNC: 2.3 GM/DL
GLUCOSE SERPL-MCNC: 337 MG/DL (ref 65–99)
HBA1C MFR BLD: 9.1 % (ref 4.8–5.6)
POTASSIUM SERPL-SCNC: 4.3 MMOL/L (ref 3.5–5.2)
PROT SERPL-MCNC: 6.2 G/DL (ref 6–8.5)
SODIUM SERPL-SCNC: 137 MMOL/L (ref 136–145)

## 2022-12-14 ENCOUNTER — OFFICE VISIT (OUTPATIENT)
Dept: FAMILY MEDICINE CLINIC | Age: 62
End: 2022-12-14

## 2022-12-14 VITALS
HEART RATE: 71 BPM | SYSTOLIC BLOOD PRESSURE: 132 MMHG | TEMPERATURE: 98.1 F | WEIGHT: 226 LBS | DIASTOLIC BLOOD PRESSURE: 83 MMHG | HEIGHT: 73 IN | BODY MASS INDEX: 29.95 KG/M2

## 2022-12-14 DIAGNOSIS — I10 ESSENTIAL (PRIMARY) HYPERTENSION: ICD-10-CM

## 2022-12-14 DIAGNOSIS — E11.8 TYPE 2 DIABETES MELLITUS WITH UNSPECIFIED COMPLICATIONS: ICD-10-CM

## 2022-12-14 DIAGNOSIS — E78.2 MIXED HYPERLIPIDEMIA: ICD-10-CM

## 2022-12-14 DIAGNOSIS — Z00.00 ANNUAL PHYSICAL EXAM: Primary | ICD-10-CM

## 2022-12-14 DIAGNOSIS — Z12.5 SCREENING FOR MALIGNANT NEOPLASM OF PROSTATE: ICD-10-CM

## 2022-12-14 DIAGNOSIS — R05.1 ACUTE COUGH: ICD-10-CM

## 2022-12-14 PROCEDURE — 99396 PREV VISIT EST AGE 40-64: CPT | Performed by: NURSE PRACTITIONER

## 2022-12-14 RX ORDER — INSULIN LISPRO 100 [IU]/ML
15 INJECTION, SUSPENSION SUBCUTANEOUS 2 TIMES DAILY WITH MEALS
Qty: 40 ML | Refills: 1 | Status: SHIPPED | OUTPATIENT
Start: 2022-12-14

## 2022-12-14 RX ORDER — BENZONATATE 100 MG/1
100 CAPSULE ORAL 3 TIMES DAILY PRN
Qty: 40 CAPSULE | Refills: 0 | Status: SHIPPED | OUTPATIENT
Start: 2022-12-14

## 2022-12-14 RX ORDER — SITAGLIPTIN AND METFORMIN HYDROCHLORIDE 1000; 50 MG/1; MG/1
1 TABLET, FILM COATED, EXTENDED RELEASE ORAL 2 TIMES DAILY WITH MEALS
Qty: 180 TABLET | Refills: 1 | Status: SHIPPED | OUTPATIENT
Start: 2022-12-14

## 2022-12-14 RX ORDER — LISINOPRIL 5 MG/1
5 TABLET ORAL DAILY
Qty: 90 TABLET | Refills: 1 | Status: SHIPPED | OUTPATIENT
Start: 2022-12-14

## 2022-12-14 RX ORDER — PRAVASTATIN SODIUM 20 MG
20 TABLET ORAL DAILY
Qty: 90 TABLET | Refills: 1 | Status: SHIPPED | OUTPATIENT
Start: 2022-12-14

## 2022-12-14 NOTE — PROGRESS NOTES
Chief Complaint  Axel Simeon presents to [facility] for Annual Exam    Subjective          History of Present Illness    Axel is here today for annual preventive exam.  Declines influenza, Tdap, PNA, zoster vaccine.  Had normal colonoscopy on 11/21/2016.  Normal PSA on 4/21/2021.  Never smoker.    He is also following up on diabetes today. Last A1C was 9.1. Currently on Humalog 75/25 15 units BID and Janumet XR 50/1000 mg BID. Also on Lisinopril. Has previously met with dietician/diabetes educator. Due for eye exam. He is scheduled for 1/2/2023. He reports blood sugar checks mostly good but some readings 180s-190s at times. He has been compliant with medications but does admit to eating some sweet items at times that he knows are not good for him.  Also with hyperlipidemia. Current medication includes pravastatin 20 mg daily.     Review of Systems   Constitutional: Negative for chills and fever.   HENT: Negative for ear pain and sore throat.    Eyes: Negative for blurred vision and redness.   Respiratory: Negative for shortness of breath and wheezing.    Cardiovascular: Negative for chest pain and palpitations.   Gastrointestinal: Negative for abdominal pain, constipation, diarrhea, nausea and vomiting.   Genitourinary: Negative for dysuria, frequency and urgency.   Musculoskeletal: Negative for back pain and joint swelling.   Skin: Negative for rash.   Neurological: Negative for dizziness and headache.   Psychiatric/Behavioral: Negative for suicidal ideas and depressed mood.           No Known Allergies   Past Medical History:   Diagnosis Date   • Coronavirus infection, unspecified    • Diverticulitis 2009    DR. GIORDANO   • Essential (primary) hypertension    • Hyperlipidemia, unspecified    • Type 2 diabetes mellitus with unspecified complications (HCC)      Current Outpatient Medications   Medication Sig Dispense Refill   • Insulin Lispro Prot & Lispro (HumaLOG Mix 75/25  "KwikPen) (75-25) 100 UNIT/ML suspension pen-injector pen Inject 15 Units under the skin into the appropriate area as directed 2 (Two) Times a Day With Meals. 40 mL 1   • lisinopril (PRINIVIL,ZESTRIL) 5 MG tablet Take 1 tablet by mouth Daily. 90 tablet 1   • pravastatin (PRAVACHOL) 20 MG tablet Take 1 tablet by mouth Daily. 90 tablet 1   • SITagliptin-metFORMIN HCl ER (Janumet XR)  MG tablet Take 1 tablet by mouth 2 (Two) Times a Day With Meals. 180 tablet 1   • benzonatate (Tessalon Perles) 100 MG capsule Take 1 capsule by mouth 3 (Three) Times a Day As Needed for Cough. 40 capsule 0     No current facility-administered medications for this visit.     Past Surgical History:   Procedure Laterality Date   • COLONOSCOPY      INTENSE REMOVAL FOR DIVERTICULITIS IN 2009       Social History     Tobacco Use   • Smoking status: Never   • Smokeless tobacco: Never   Vaping Use   • Vaping Use: Never used   Substance Use Topics   • Alcohol use: Yes     Comment: 1x monthly   • Drug use: Never     History reviewed. No pertinent family history.  Health Maintenance Due   Topic Date Due   • DIABETIC EYE EXAM  03/06/2022      Immunization History   Administered Date(s) Administered   • COVID-19 (JUAN) 08/13/2021        Objective     Vitals:    12/14/22 1528   BP: 132/83   BP Location: Left arm   Patient Position: Sitting   Pulse: 71   Temp: 98.1 °F (36.7 °C)   TempSrc: Oral   Weight: 103 kg (226 lb)   Height: 185.4 cm (72.99\")     Body mass index is 29.83 kg/m².     Physical Exam  Vitals reviewed.   Constitutional:       General: He is not in acute distress.     Appearance: Normal appearance.   HENT:      Head: Normocephalic and atraumatic.      Right Ear: Hearing, tympanic membrane and ear canal normal.      Left Ear: Hearing, tympanic membrane and ear canal normal.      Mouth/Throat:      Mouth: Mucous membranes are moist.   Eyes:      Extraocular Movements: Extraocular movements intact.      Pupils: Pupils are " equal, round, and reactive to light.   Cardiovascular:      Rate and Rhythm: Normal rate and regular rhythm.   Pulmonary:      Effort: Pulmonary effort is normal. No respiratory distress.      Breath sounds: Normal breath sounds.   Abdominal:      General: Bowel sounds are normal.      Palpations: Abdomen is soft.      Tenderness: There is no abdominal tenderness.   Musculoskeletal:         General: Normal range of motion.      Cervical back: Normal range of motion and neck supple.   Skin:     General: Skin is warm and dry.   Neurological:      Mental Status: He is alert and oriented to person, place, and time.   Psychiatric:         Mood and Affect: Mood normal.           Result Review :                               Assessment and Plan      Diagnoses and all orders for this visit:    1. Annual physical exam (Primary)  Comments:  Appropriate screenings and vaccinations were reviewed with the pt and offered as indicated.  Pt counseled on healthy lifestyle including healthy diet, exercise.  Orders:  -     CBC & Differential; Future  -     Comprehensive Metabolic Panel; Future  -     Lipid Panel; Future    2. Screening for malignant neoplasm of prostate  -     PSA SCREENING; Future    3. Type 2 diabetes mellitus with unspecified complications (HCC)  Comments:  Diabetic diet, continue current medications. Will return for fasting labs in January. Keep appt for eye exam.   Orders:  -     Hemoglobin A1c; Future  -     SITagliptin-metFORMIN HCl ER (Janumet XR)  MG tablet; Take 1 tablet by mouth 2 (Two) Times a Day With Meals.  Dispense: 180 tablet; Refill: 1  -     Insulin Lispro Prot & Lispro (HumaLOG Mix 75/25 KwikPen) (75-25) 100 UNIT/ML suspension pen-injector pen; Inject 15 Units under the skin into the appropriate area as directed 2 (Two) Times a Day With Meals.  Dispense: 40 mL; Refill: 1    4. Essential (primary) hypertension  Comments:  Medical condition is stable.  Continue same therapy.  Will recheck at  next regular appointment.  Orders:  -     lisinopril (PRINIVIL,ZESTRIL) 5 MG tablet; Take 1 tablet by mouth Daily.  Dispense: 90 tablet; Refill: 1    5. Mixed hyperlipidemia  Comments:  Medical condition is stable.  Continue same therapy.  Will recheck at next regular appointment.  Orders:  -     pravastatin (PRAVACHOL) 20 MG tablet; Take 1 tablet by mouth Daily.  Dispense: 90 tablet; Refill: 1    6. Acute cough  -     benzonatate (Tessalon Perles) 100 MG capsule; Take 1 capsule by mouth 3 (Three) Times a Day As Needed for Cough.  Dispense: 40 capsule; Refill: 0                Follow Up     Return in about 4 months (around 4/14/2023).

## 2022-12-20 ENCOUNTER — PRIOR AUTHORIZATION (OUTPATIENT)
Dept: FAMILY MEDICINE CLINIC | Age: 62
End: 2022-12-20

## 2023-01-09 ENCOUNTER — TELEPHONE (OUTPATIENT)
Dept: FAMILY MEDICINE CLINIC | Age: 63
End: 2023-01-09
Payer: COMMERCIAL

## 2023-03-16 NOTE — PROGRESS NOTES
Covering provider sent in refill on 3/14/23.     Jo-Ann Cooper RN on 3/16/2023 at 10:02 AM         "Chief Complaint  Hypertension and Hyperlipidemia    Subjective          Axel Simeon presents to [facility]    Axel is here to follow up on chronic health conditions.  He has history of diabetes. Last A1C was 10.3 on 4/21/21. Currently on Humalog and Janumet. Also on Lisinopril. Has previously met with dietician/diabetes educator. Last eye exam March 2021. Last foot exam 4/2021.   Also with history of hyperlipidemia which was elevated on previous check. Pravastatin prescribed but had not been taking his medications. Reports that he has been compliant with taking his medications since last visit.              Objective   Vital Signs:   /84 (BP Location: Right arm, Patient Position: Sitting)   Pulse 71   Temp 97.9 °F (36.6 °C) (Oral)   Ht 181.6 cm (71.5\")   Wt 100 kg (220 lb 6.4 oz)   BMI 30.31 kg/m²     Physical Exam  Vitals reviewed.   Constitutional:       General: He is not in acute distress.     Appearance: Normal appearance. He is well-developed.   HENT:      Head: Normocephalic and atraumatic.   Cardiovascular:      Rate and Rhythm: Normal rate and regular rhythm.   Pulmonary:      Effort: Pulmonary effort is normal.      Breath sounds: Normal breath sounds.   Neurological:      Mental Status: He is alert and oriented to person, place, and time.   Psychiatric:         Mood and Affect: Mood and affect normal.          Result Review :   The following data was reviewed by: JITENDRA Osorio on 08/02/2021:  PSA Screen (04/21/2021 16:10)  Diabetes Panel 2 (04/21/2021 16:10)           Assessment and Plan    Diagnoses and all orders for this visit:    1. Type 2 diabetes mellitus with unspecified complications (CMS/[facility]) (Primary)  Comments:  Instructed in use of glucometer, adherance to Low carb, NCS diet, daily foot self-inspection, annual eye exams, and need for yearly flu shots.      Orders:  -     CBC and Differential; Future  -     Comprehensive metabolic " panel; Future  -     Hemoglobin A1c; Future  -     Lipid panel; Future  -     Microalbumin / Creatinine Urine Ratio - Urine, Clean Catch; Future    2. Mixed hyperlipidemia  Comments:  Checking labs as above  Orders:  -     pravastatin (PRAVACHOL) 10 MG tablet; Take 1 tablet by mouth Daily.  Dispense: 90 tablet; Refill: 0    3. Essential (primary) hypertension  Comments:  Medical condition is stable.  Continue same therapy.  Will recheck at next regular appointment.  Orders:  -     lisinopril (PRINIVIL,ZESTRIL) 5 MG tablet; Take 1 tablet by mouth Daily.  Dispense: 90 tablet; Refill: 0    4. Screening for viral disease  -     Hepatitis C Antibody; Future        Follow Up    Return in about 3 months (around 11/2/2021) for Annual physical.  Patient was given instructions and counseling regarding his condition or for health maintenance advice. Please see specific information pulled into the AVS if appropriate.

## 2023-04-06 ENCOUNTER — LAB (OUTPATIENT)
Dept: LAB | Facility: HOSPITAL | Age: 63
End: 2023-04-06
Payer: COMMERCIAL

## 2023-04-06 DIAGNOSIS — Z00.00 ANNUAL PHYSICAL EXAM: ICD-10-CM

## 2023-04-06 DIAGNOSIS — Z12.5 SCREENING FOR MALIGNANT NEOPLASM OF PROSTATE: ICD-10-CM

## 2023-04-06 DIAGNOSIS — E11.8 TYPE 2 DIABETES MELLITUS WITH UNSPECIFIED COMPLICATIONS: ICD-10-CM

## 2023-04-06 LAB
ALBUMIN SERPL-MCNC: 3.7 G/DL (ref 3.5–5.2)
ALBUMIN/GLOB SERPL: 1.5 G/DL
ALP SERPL-CCNC: 93 U/L (ref 39–117)
ALT SERPL W P-5'-P-CCNC: 16 U/L (ref 1–41)
ANION GAP SERPL CALCULATED.3IONS-SCNC: 6 MMOL/L (ref 5–15)
AST SERPL-CCNC: 21 U/L (ref 1–40)
BASOPHILS # BLD AUTO: 0.05 10*3/MM3 (ref 0–0.2)
BASOPHILS NFR BLD AUTO: 0.9 % (ref 0–1.5)
BILIRUB SERPL-MCNC: 0.4 MG/DL (ref 0–1.2)
BUN SERPL-MCNC: 18 MG/DL (ref 8–23)
BUN/CREAT SERPL: 14.6 (ref 7–25)
CALCIUM SPEC-SCNC: 8.7 MG/DL (ref 8.6–10.5)
CHLORIDE SERPL-SCNC: 105 MMOL/L (ref 98–107)
CHOLEST SERPL-MCNC: 191 MG/DL (ref 0–200)
CO2 SERPL-SCNC: 27 MMOL/L (ref 22–29)
CREAT SERPL-MCNC: 1.23 MG/DL (ref 0.76–1.27)
DEPRECATED RDW RBC AUTO: 37.5 FL (ref 37–54)
EGFRCR SERPLBLD CKD-EPI 2021: 66.4 ML/MIN/1.73
EOSINOPHIL # BLD AUTO: 0.19 10*3/MM3 (ref 0–0.4)
EOSINOPHIL NFR BLD AUTO: 3.4 % (ref 0.3–6.2)
ERYTHROCYTE [DISTWIDTH] IN BLOOD BY AUTOMATED COUNT: 11.8 % (ref 12.3–15.4)
GLOBULIN UR ELPH-MCNC: 2.5 GM/DL
GLUCOSE SERPL-MCNC: 279 MG/DL (ref 65–99)
HBA1C MFR BLD: 10.5 % (ref 4.8–5.6)
HCT VFR BLD AUTO: 42.7 % (ref 37.5–51)
HDLC SERPL-MCNC: 41 MG/DL (ref 40–60)
HGB BLD-MCNC: 14.6 G/DL (ref 13–17.7)
IMM GRANULOCYTES # BLD AUTO: 0.03 10*3/MM3 (ref 0–0.05)
IMM GRANULOCYTES NFR BLD AUTO: 0.5 % (ref 0–0.5)
LDLC SERPL CALC-MCNC: 97 MG/DL (ref 0–100)
LDLC/HDLC SERPL: 2.13 {RATIO}
LYMPHOCYTES # BLD AUTO: 1.38 10*3/MM3 (ref 0.7–3.1)
LYMPHOCYTES NFR BLD AUTO: 24.5 % (ref 19.6–45.3)
MCH RBC QN AUTO: 29.2 PG (ref 26.6–33)
MCHC RBC AUTO-ENTMCNC: 34.2 G/DL (ref 31.5–35.7)
MCV RBC AUTO: 85.4 FL (ref 79–97)
MONOCYTES # BLD AUTO: 0.49 10*3/MM3 (ref 0.1–0.9)
MONOCYTES NFR BLD AUTO: 8.7 % (ref 5–12)
NEUTROPHILS NFR BLD AUTO: 3.49 10*3/MM3 (ref 1.7–7)
NEUTROPHILS NFR BLD AUTO: 62 % (ref 42.7–76)
NRBC BLD AUTO-RTO: 0 /100 WBC (ref 0–0.2)
PLATELET # BLD AUTO: 210 10*3/MM3 (ref 140–450)
PMV BLD AUTO: 10.8 FL (ref 6–12)
POTASSIUM SERPL-SCNC: 4.4 MMOL/L (ref 3.5–5.2)
PROT SERPL-MCNC: 6.2 G/DL (ref 6–8.5)
PSA SERPL-MCNC: 1.1 NG/ML (ref 0–4)
RBC # BLD AUTO: 5 10*6/MM3 (ref 4.14–5.8)
SODIUM SERPL-SCNC: 138 MMOL/L (ref 136–145)
TRIGL SERPL-MCNC: 313 MG/DL (ref 0–150)
VLDLC SERPL-MCNC: 53 MG/DL (ref 5–40)
WBC NRBC COR # BLD: 5.63 10*3/MM3 (ref 3.4–10.8)

## 2023-04-06 PROCEDURE — G0103 PSA SCREENING: HCPCS

## 2023-04-06 PROCEDURE — 80061 LIPID PANEL: CPT

## 2023-04-06 PROCEDURE — 80053 COMPREHEN METABOLIC PANEL: CPT

## 2023-04-06 PROCEDURE — 36415 COLL VENOUS BLD VENIPUNCTURE: CPT

## 2023-04-06 PROCEDURE — 83036 HEMOGLOBIN GLYCOSYLATED A1C: CPT

## 2023-04-06 PROCEDURE — 85025 COMPLETE CBC W/AUTO DIFF WBC: CPT

## 2023-04-14 ENCOUNTER — OFFICE VISIT (OUTPATIENT)
Dept: FAMILY MEDICINE CLINIC | Age: 63
End: 2023-04-14
Payer: COMMERCIAL

## 2023-04-14 VITALS
BODY MASS INDEX: 29.93 KG/M2 | WEIGHT: 225.8 LBS | HEIGHT: 73 IN | TEMPERATURE: 98 F | SYSTOLIC BLOOD PRESSURE: 125 MMHG | HEART RATE: 72 BPM | DIASTOLIC BLOOD PRESSURE: 67 MMHG

## 2023-04-14 DIAGNOSIS — I10 ESSENTIAL (PRIMARY) HYPERTENSION: ICD-10-CM

## 2023-04-14 DIAGNOSIS — E78.2 MIXED HYPERLIPIDEMIA: ICD-10-CM

## 2023-04-14 DIAGNOSIS — E11.8 TYPE 2 DIABETES MELLITUS WITH UNSPECIFIED COMPLICATIONS: ICD-10-CM

## 2023-04-14 PROCEDURE — 99214 OFFICE O/P EST MOD 30 MIN: CPT | Performed by: NURSE PRACTITIONER

## 2023-04-14 RX ORDER — INSULIN LISPRO 100 [IU]/ML
20 INJECTION, SUSPENSION SUBCUTANEOUS 2 TIMES DAILY WITH MEALS
Qty: 40 ML | Refills: 0 | Status: SHIPPED | OUTPATIENT
Start: 2023-04-14

## 2023-04-14 RX ORDER — LISINOPRIL 5 MG/1
5 TABLET ORAL DAILY
Qty: 90 TABLET | Refills: 0 | Status: SHIPPED | OUTPATIENT
Start: 2023-04-14

## 2023-04-14 RX ORDER — PRAVASTATIN SODIUM 20 MG
20 TABLET ORAL DAILY
Qty: 90 TABLET | Refills: 0 | Status: SHIPPED | OUTPATIENT
Start: 2023-04-14

## 2023-04-14 RX ORDER — SITAGLIPTIN AND METFORMIN HYDROCHLORIDE 1000; 50 MG/1; MG/1
1 TABLET, FILM COATED, EXTENDED RELEASE ORAL 2 TIMES DAILY WITH MEALS
Qty: 180 TABLET | Refills: 0 | Status: SHIPPED | OUTPATIENT
Start: 2023-04-14

## 2023-04-14 NOTE — PROGRESS NOTES
Chief Complaint  Axel Simeon presents to [facility] for Diabetes    Subjective          History of Present Illness    Axel is also following up on diabetes today. Last A1C was 10.5. Currently on Humalog 75/25 17 units BID and Janumet XR 50/1000 mg BID. Also on Lisinopril. Has previously met with dietician/diabetes educator. UTD eye exam. Also with hyperlipidemia. Current medication includes pravastatin 20 mg daily. Admits to eating at [employer] - sloppy Joes, drinking sodas, and not exercising. He forgets to take his insulin some mornings. Does not take Janumet some days.     Review of Systems      No Known Allergies   Past Medical History:   Diagnosis Date   • Coronavirus infection, unspecified    • Diverticulitis 2009    DR. GIORDANO   • Essential (primary) hypertension    • Hyperlipidemia, unspecified    • Type 2 diabetes mellitus with unspecified complications      Current Outpatient Medications   Medication Sig Dispense Refill   • Insulin Lispro Prot & Lispro (HumaLOG Mix 75/25 KwikPen) (75-25) 100 UNIT/ML suspension pen-injector pen Inject 20 Units under the skin into the appropriate area as directed 2 (Two) Times a Day With Meals. 40 mL 0   • lisinopril (PRINIVIL,ZESTRIL) 5 MG tablet Take 1 tablet by mouth Daily. 90 tablet 0   • pravastatin (PRAVACHOL) 20 MG tablet Take 1 tablet by mouth Daily. 90 tablet 0   • SITagliptin-metFORMIN HCl ER (Janumet XR)  MG tablet Take 1 tablet by mouth 2 (Two) Times a Day With Meals. 180 tablet 0     No current facility-administered medications for this visit.     Past Surgical History:   Procedure Laterality Date   • COLONOSCOPY      INTENSE REMOVAL FOR DIVERTICULITIS IN 2009       Social History     Tobacco Use   • Smoking status: Never     Passive exposure: Never   • Smokeless tobacco: Never   Vaping Use   • Vaping Use: Never used   Substance Use Topics   • Alcohol use: Yes     Comment: 1x monthly   • Drug use: Never  "    History reviewed. No pertinent family history.  Health Maintenance Due   Topic Date Due   • URINE MICROALBUMIN  02/21/2023      Immunization History   Administered Date(s) Administered   • COVID-19 (JUAN) 08/13/2021        Objective     Vitals:    04/14/23 1542   BP: 125/67   BP Location: Right arm   Patient Position: Sitting   Cuff Size: Adult   Pulse: 72   Temp: 98 °F (36.7 °C)   TempSrc: Oral   Weight: 102 kg (225 lb 12.8 oz)   Height: 185.4 cm (72.99\")     Body mass index is 29.8 kg/m².     Physical Exam      Result Review :     The following data was reviewed by: JITENDRA Osorio on 04/14/2023:          Hemoglobin A1c (04/06/2023 16:14)  Lipid Panel (04/06/2023 16:14)  Comprehensive Metabolic Panel (04/06/2023 16:14)  CBC & Differential (04/06/2023 16:14)  PSA SCREENING (04/06/2023 16:14)                  Assessment and Plan      Diagnoses and all orders for this visit:    1. Type 2 diabetes mellitus with unspecified complications  Comments:  Discussed labs and poorly controlled DM. Get back on track with diet/exercise. Be consistent with meds. Increase humalog dose.   Orders:  -     Insulin Lispro Prot & Lispro (HumaLOG Mix 75/25 KwikPen) (75-25) 100 UNIT/ML suspension pen-injector pen; Inject 20 Units under the skin into the appropriate area as directed 2 (Two) Times a Day With Meals.  Dispense: 40 mL; Refill: 0  -     SITagliptin-metFORMIN HCl ER (Janumet XR)  MG tablet; Take 1 tablet by mouth 2 (Two) Times a Day With Meals.  Dispense: 180 tablet; Refill: 0    2. Essential (primary) hypertension  Comments:  Medical condition is stable.  Continue same therapy.  Will recheck at next regular appointment.  Orders:  -     lisinopril (PRINIVIL,ZESTRIL) 5 MG tablet; Take 1 tablet by mouth Daily.  Dispense: 90 tablet; Refill: 0    3. Mixed hyperlipidemia  Comments:  Medical condition is stable.  Continue same therapy.  Will recheck at next regular appointment.  Orders:  -     pravastatin " (PRAVACHOL) 20 MG tablet; Take 1 tablet by mouth Daily.  Dispense: 90 tablet; Refill: 0              Follow Up     Return in about 3 months (around 7/14/2023) for Recheck.

## 2023-09-12 DIAGNOSIS — E11.8 TYPE 2 DIABETES MELLITUS WITH UNSPECIFIED COMPLICATIONS: ICD-10-CM

## 2023-09-13 RX ORDER — INSULIN LISPRO 100 [IU]/ML
INJECTION, SUSPENSION SUBCUTANEOUS
Qty: 30 ML | Refills: 0 | Status: SHIPPED | OUTPATIENT
Start: 2023-09-13

## 2023-10-20 ENCOUNTER — OFFICE VISIT (OUTPATIENT)
Dept: FAMILY MEDICINE CLINIC | Age: 63
End: 2023-10-20
Payer: COMMERCIAL

## 2023-10-20 VITALS
HEART RATE: 68 BPM | BODY MASS INDEX: 29.1 KG/M2 | OXYGEN SATURATION: 97 % | HEIGHT: 73 IN | SYSTOLIC BLOOD PRESSURE: 118 MMHG | DIASTOLIC BLOOD PRESSURE: 82 MMHG | WEIGHT: 219.6 LBS | TEMPERATURE: 98 F

## 2023-10-20 DIAGNOSIS — D22.9 ATYPICAL MOLE: ICD-10-CM

## 2023-10-20 DIAGNOSIS — I10 ESSENTIAL (PRIMARY) HYPERTENSION: ICD-10-CM

## 2023-10-20 DIAGNOSIS — E11.8 TYPE 2 DIABETES MELLITUS WITH UNSPECIFIED COMPLICATIONS: Primary | ICD-10-CM

## 2023-10-20 DIAGNOSIS — E78.2 MIXED HYPERLIPIDEMIA: ICD-10-CM

## 2023-10-20 LAB
EXPIRATION DATE: ABNORMAL
HBA1C MFR BLD: 11.5 % (ref 4.5–5.7)
Lab: ABNORMAL

## 2023-10-20 RX ORDER — PRAVASTATIN SODIUM 20 MG
20 TABLET ORAL DAILY
Qty: 90 TABLET | Refills: 0 | Status: SHIPPED | OUTPATIENT
Start: 2023-10-20

## 2023-10-20 RX ORDER — INSULIN LISPRO 100 [IU]/ML
15 INJECTION, SUSPENSION SUBCUTANEOUS 2 TIMES DAILY WITH MEALS
Qty: 30 ML | Refills: 0 | Status: SHIPPED | OUTPATIENT
Start: 2023-10-20

## 2023-10-20 RX ORDER — SITAGLIPTIN AND METFORMIN HYDROCHLORIDE 1000; 50 MG/1; MG/1
1 TABLET, FILM COATED, EXTENDED RELEASE ORAL DAILY
Qty: 90 TABLET | Refills: 0 | Status: SHIPPED | OUTPATIENT
Start: 2023-10-20

## 2023-10-20 RX ORDER — LISINOPRIL 5 MG/1
5 TABLET ORAL DAILY
Qty: 90 TABLET | Refills: 0 | Status: SHIPPED | OUTPATIENT
Start: 2023-10-20

## 2023-10-20 NOTE — PROGRESS NOTES
Chief Complaint  Axel Simeon presents to [facility] for Diabetes    Subjective          History of Present Illness    Axel is following up on diabetes today. Last A1C was 8.8. Currently taking Humalog 75/25 15 units BID and Janumet XR 50/1000 mg once daily. Reports BG readings low 100s. Also on Lisinopril. Has previously met with dietician/diabetes educator. UTD eye exam. Also on pravastatin 20 mg daily for hyperlipidemia.   He has several moles on face and shoulder. One on eyelid as well. This is bothering him as in vision field. Would like to see dermatologist.     Review of Systems      No Known Allergies   Past Medical History:   Diagnosis Date    Coronavirus infection, unspecified     Diverticulitis 2009    DR. GIORDANO    Essential (primary) hypertension     Hyperlipidemia, unspecified     Type 2 diabetes mellitus with unspecified complications      Current Outpatient Medications   Medication Sig Dispense Refill    Insulin Lispro Prot & Lispro (HumaLOG Mix 75/25 KwikPen) (75-25) 100 UNIT/ML suspension pen-injector pen Inject 15 Units under the skin into the appropriate area as directed 2 (Two) Times a Day With Meals. 30 mL 0    lisinopril (PRINIVIL,ZESTRIL) 5 MG tablet Take 1 tablet by mouth Daily. 90 tablet 0    pravastatin (PRAVACHOL) 20 MG tablet Take 1 tablet by mouth Daily. 90 tablet 0    SITagliptin-metFORMIN HCl ER (Janumet XR)  MG tablet Take 1 tablet by mouth Daily. 90 tablet 0     No current facility-administered medications for this visit.     Past Surgical History:   Procedure Laterality Date    COLONOSCOPY      INTENSE REMOVAL FOR DIVERTICULITIS IN 2009       Social History     Tobacco Use    Smoking status: Never     Passive exposure: Never    Smokeless tobacco: Never   Vaping Use    Vaping Use: Never used   Substance Use Topics    Alcohol use: Yes     Comment: 1x monthly    Drug use: Never     History reviewed. No pertinent family  "history.  There are no preventive care reminders to display for this patient.     Immunization History   Administered Date(s) Administered    COVID-19 (JUAN) 08/13/2021        Objective     Vitals:    10/20/23 1136 10/20/23 1139   BP: 142/99 118/82   BP Location: Right arm Left arm   Patient Position: Sitting Sitting   Cuff Size: Large Adult Large Adult   Pulse: 66 68   Temp: 98 °F (36.7 °C)    TempSrc: Oral    SpO2: 97%    Weight: 99.6 kg (219 lb 9.6 oz)    Height: 185.4 cm (72.99\")      Body mass index is 28.98 kg/m².             Physical Exam  Vitals reviewed.   Constitutional:       General: He is not in acute distress.     Appearance: Normal appearance. He is well-developed.   HENT:      Head: Normocephalic and atraumatic.   Cardiovascular:      Rate and Rhythm: Normal rate and regular rhythm.   Pulmonary:      Effort: Pulmonary effort is normal.      Breath sounds: Normal breath sounds.   Skin:     Comments: Moles to right cheek, right shoulder, left eyelid   Neurological:      Mental Status: He is alert and oriented to person, place, and time.   Psychiatric:         Mood and Affect: Mood and affect normal.           Result Review :     The following data was reviewed by: JITENDRA Osorio on 10/20/2023:          POC Glycosylated Hemoglobin (Hb A1C) (10/20/2023 12:20)                   Assessment and Plan      Diagnoses and all orders for this visit:    1. Type 2 diabetes mellitus with unspecified complications (Primary)  Comments:  A1C not well controlled at 11.5. Recommend increasing novolog dose. Continued efforts with diabetic diet. He is planning on joining gym.  Orders:  -     SITagliptin-metFORMIN HCl ER (Janumet XR)  MG tablet; Take 1 tablet by mouth Daily.  Dispense: 90 tablet; Refill: 0  -     Insulin Lispro Prot & Lispro (HumaLOG Mix 75/25 KwikPen) (75-25) 100 UNIT/ML suspension pen-injector pen; Inject 15 Units under the skin into the appropriate area as directed 2 (Two) Times a Day " With Meals.  Dispense: 30 mL; Refill: 0  -     POC Glycosylated Hemoglobin (Hb A1C)    2. Essential (primary) hypertension  Comments:  Medical condition is stable.  Continue same therapy.  Will recheck at next regular appointment.  Orders:  -     lisinopril (PRINIVIL,ZESTRIL) 5 MG tablet; Take 1 tablet by mouth Daily.  Dispense: 90 tablet; Refill: 0    3. Mixed hyperlipidemia  Comments:  Medical condition is stable.  Continue same therapy.  Will recheck at next regular appointment.  Orders:  -     pravastatin (PRAVACHOL) 20 MG tablet; Take 1 tablet by mouth Daily.  Dispense: 90 tablet; Refill: 0    4. Atypical mole  -     Ambulatory Referral to Dermatology              Follow Up     No follow-ups on file.

## 2023-11-29 ENCOUNTER — PRIOR AUTHORIZATION (OUTPATIENT)
Dept: FAMILY MEDICINE CLINIC | Age: 63
End: 2023-11-29
Payer: COMMERCIAL

## 2024-01-22 ENCOUNTER — OFFICE VISIT (OUTPATIENT)
Dept: FAMILY MEDICINE CLINIC | Age: 64
End: 2024-01-22
Payer: COMMERCIAL

## 2024-01-22 VITALS
HEIGHT: 73 IN | SYSTOLIC BLOOD PRESSURE: 132 MMHG | OXYGEN SATURATION: 99 % | TEMPERATURE: 97.8 F | DIASTOLIC BLOOD PRESSURE: 77 MMHG | WEIGHT: 225 LBS | HEART RATE: 75 BPM | BODY MASS INDEX: 29.82 KG/M2

## 2024-01-22 DIAGNOSIS — E11.8 TYPE 2 DIABETES MELLITUS WITH UNSPECIFIED COMPLICATIONS: ICD-10-CM

## 2024-01-22 DIAGNOSIS — E78.2 MIXED HYPERLIPIDEMIA: ICD-10-CM

## 2024-01-22 DIAGNOSIS — Z00.00 ANNUAL PHYSICAL EXAM: Primary | ICD-10-CM

## 2024-01-22 DIAGNOSIS — I10 ESSENTIAL (PRIMARY) HYPERTENSION: ICD-10-CM

## 2024-01-22 PROCEDURE — 99396 PREV VISIT EST AGE 40-64: CPT | Performed by: NURSE PRACTITIONER

## 2024-01-22 RX ORDER — SITAGLIPTIN AND METFORMIN HYDROCHLORIDE 1000; 50 MG/1; MG/1
1 TABLET, FILM COATED, EXTENDED RELEASE ORAL DAILY
Qty: 90 TABLET | Refills: 0 | Status: SHIPPED | OUTPATIENT
Start: 2024-01-22

## 2024-01-22 RX ORDER — PRAVASTATIN SODIUM 20 MG
20 TABLET ORAL DAILY
Qty: 90 TABLET | Refills: 0 | Status: SHIPPED | OUTPATIENT
Start: 2024-01-22

## 2024-01-22 RX ORDER — LISINOPRIL 5 MG/1
5 TABLET ORAL DAILY
Qty: 90 TABLET | Refills: 0 | Status: SHIPPED | OUTPATIENT
Start: 2024-01-22

## 2024-01-22 NOTE — PROGRESS NOTES
Chief Complaint  Axel Simeon presents to [facility] for Annual Exam    Subjective          History of Present Illness    Axel is here today for annual preventive exam.  Declines influenza, Tdap, PNA, zoster vaccine.  Had normal colonoscopy on 11/21/2016.  Normal PSA on 4/6/2023.  Never smoker.    Axel is following up on diabetes today. Last A1C was 11.5. Currently taking Humalog 75/25 15 units BID and Janumet XR 50/1000 mg once daily. Also on Lisinopril. Has previously met with dietician/diabetes educator. UTD eye exam. Also on pravastatin 20 mg daily for hyperlipidemia.    He has dermatology appointment in a couple of weeks. Previously had to reschedule due to schedule conflict.     Review of Systems   Constitutional:  Negative for chills and fever.   HENT:  Negative for ear pain and sore throat.    Eyes:  Negative for blurred vision and redness.   Respiratory:  Negative for shortness of breath and wheezing.    Cardiovascular:  Negative for chest pain and palpitations.   Gastrointestinal:  Negative for abdominal pain and vomiting.   Genitourinary:  Negative for frequency and urgency.   Neurological:  Negative for seizures and syncope.   Psychiatric/Behavioral:  Negative for suicidal ideas and depressed mood.          No Known Allergies   Past Medical History:   Diagnosis Date    Coronavirus infection, unspecified     Diverticulitis 2009    DR. GIORDANO    Essential (primary) hypertension     Hyperlipidemia, unspecified     Type 2 diabetes mellitus with unspecified complications      Current Outpatient Medications   Medication Sig Dispense Refill    Insulin Lispro Prot & Lispro (HumaLOG Mix 75/25 KwikPen) (75-25) 100 UNIT/ML suspension pen-injector pen Inject 15 Units under the skin into the appropriate area as directed 2 (Two) Times a Day With Meals. 30 mL 0    lisinopril (PRINIVIL,ZESTRIL) 5 MG tablet Take 1 tablet by mouth Daily. 90 tablet 0    pravastatin (PRAVACHOL) 20  "MG tablet Take 1 tablet by mouth Daily. 90 tablet 0    SITagliptin-metFORMIN HCl ER (Janumet XR)  MG tablet Take 1 tablet by mouth Daily. 90 tablet 0     No current facility-administered medications for this visit.     Past Surgical History:   Procedure Laterality Date    COLONOSCOPY      INTENSE REMOVAL FOR DIVERTICULITIS IN 2009       Social History     Tobacco Use    Smoking status: Never     Passive exposure: Never    Smokeless tobacco: Never   Vaping Use    Vaping Use: Never used   Substance Use Topics    Alcohol use: Yes     Comment: 1x monthly    Drug use: Never     History reviewed. No pertinent family history.  There are no preventive care reminders to display for this patient.     Immunization History   Administered Date(s) Administered    COVID-19 ([employer]) 08/13/2021        Objective     Vitals:    01/22/24 1613   BP: 132/77   BP Location: Left arm   Patient Position: Sitting   Cuff Size: Large Adult   Pulse: 75   Temp: 97.8 °F (36.6 °C)   TempSrc: Oral   SpO2: 99%   Weight: 102 kg (225 lb)   Height: 185.4 cm (72.99\")     Body mass index is 29.69 kg/m².             Physical Exam  Vitals reviewed.   Constitutional:       General: He is not in acute distress.     Appearance: Normal appearance.   HENT:      Head: Normocephalic and atraumatic.      Right Ear: Hearing, tympanic membrane and ear canal normal.      Left Ear: Hearing, tympanic membrane and ear canal normal.      Mouth/Throat:      Mouth: Mucous membranes are moist.   Eyes:      Extraocular Movements: Extraocular movements intact.      Pupils: Pupils are equal, round, and reactive to light.   Cardiovascular:      Rate and Rhythm: Normal rate and regular rhythm.   Pulmonary:      Effort: Pulmonary effort is normal. No respiratory distress.      Breath sounds: Normal breath sounds.   Abdominal:      General: Bowel sounds are normal.      Palpations: Abdomen is soft.      Tenderness: There is no abdominal tenderness. "   Musculoskeletal:         General: Normal range of motion.      Cervical back: Normal range of motion and neck supple.   Neurological:      Mental Status: He is alert and oriented to person, place, and time.   Psychiatric:         Mood and Affect: Mood normal.           Result Review :                               Assessment and Plan      Diagnoses and all orders for this visit:    1. Annual physical exam (Primary)  Comments:  Appropriate screenings and vaccinations were reviewed with the pt and offered as indicated.  Pt counseled on healthy lifestyle including healthy diet, exercise.    2. Type 2 diabetes mellitus with unspecified complications  Comments:  A1C previously not well controlled.  Orders:  -     SITagliptin-metFORMIN HCl ER (Janumet XR)  MG tablet; Take 1 tablet by mouth Daily.  Dispense: 90 tablet; Refill: 0  -     Comprehensive metabolic panel; Future  -     Hemoglobin A1c; Future    3. Mixed hyperlipidemia  Comments:  Medical condition is stable.  Continue same therapy.  Will recheck at next regular appointment.  Orders:  -     pravastatin (PRAVACHOL) 20 MG tablet; Take 1 tablet by mouth Daily.  Dispense: 90 tablet; Refill: 0    4. Essential (primary) hypertension  Comments:  Medical condition is stable.  Continue same therapy.  Will recheck at next regular appointment.  Orders:  -     lisinopril (PRINIVIL,ZESTRIL) 5 MG tablet; Take 1 tablet by mouth Daily.  Dispense: 90 tablet; Refill: 0            Follow Up     Return in about 3 months (around 4/22/2024).

## 2024-02-26 ENCOUNTER — TELEPHONE (OUTPATIENT)
Dept: FAMILY MEDICINE CLINIC | Age: 64
End: 2024-02-26
Payer: COMMERCIAL

## 2024-02-26 NOTE — TELEPHONE ENCOUNTER
LVM reminding of overdue lab order from 01/22/2024. (1st Attempt)    [Home] : at home, [unfilled] , at the time of the visit. [Other Location: e.g. Home (Enter Location, City,State)___] : at [unfilled] [Verbal consent obtained from patient] : the patient, [unfilled] [None] : no changes  [___ Month(s) Ago] : [unfilled] month(s) ago [Primary] : primary hypertension [Hyperlipidemia] : hyperlipidemia [de-identified] : polymyalgia [de-identified] : Moved to [place] 4 months ago [FreeTextEntry1] : - The patient has chronic hypertension on listed medication, and moved to [place] 4 months ago. His blood pressure has been increasing since that time.  The patient sent a chart of 1 month of BP readings, which show initial readings of 160 systolic or more. Upon rechecks the readings reveal 140 and 130 systolic levels, which is still elevated. He also notes occasional pulse of under 60. This BP log has been scanned into the medical chart.  The patient is otherwise fine, with no changes in medications or clinical change. He currently weighs 197 lbs reportedly, and exercises virtually every day vigorously. His BP cuff was recently validated in my office.\par - The patient had been on amlodipine, which was effective and well-tolerated, but has to be stopped due to this causing gingival hyperplasia, and thus ceased.  He is currently taking Bystolic 5mg QD and HCTZ 25mg 5x weekly.\par \par Interval Data:\par - Labs from 1/15/20 reveal: glucose 106, BUN 26, creatinine 1.32, eGFR 52\par \par Current Medications: ASA 81mg QD, Lipitor 10mg QD, Bystolic 5mg QD, HCTZ 25mg 5x QW, telmisartan 80mg QD, Nexium 40mg QD, Tylenol prn (for back pain).

## 2024-03-28 DIAGNOSIS — E11.8 TYPE 2 DIABETES MELLITUS WITH UNSPECIFIED COMPLICATIONS: ICD-10-CM

## 2024-03-28 RX ORDER — INSULIN LISPRO 100 [IU]/ML
INJECTION, SUSPENSION SUBCUTANEOUS
Qty: 30 ML | Refills: 0 | Status: SHIPPED | OUTPATIENT
Start: 2024-03-28

## 2024-04-04 ENCOUNTER — LAB (OUTPATIENT)
Dept: LAB | Facility: HOSPITAL | Age: 64
End: 2024-04-04
Payer: COMMERCIAL

## 2024-04-04 DIAGNOSIS — E11.8 TYPE 2 DIABETES MELLITUS WITH UNSPECIFIED COMPLICATIONS: ICD-10-CM

## 2024-04-04 LAB
ALBUMIN SERPL-MCNC: 4 G/DL (ref 3.5–5.2)
ALBUMIN/GLOB SERPL: 1.9 G/DL
ALP SERPL-CCNC: 92 U/L (ref 39–117)
ALT SERPL W P-5'-P-CCNC: 19 U/L (ref 1–41)
ANION GAP SERPL CALCULATED.3IONS-SCNC: 8.9 MMOL/L (ref 5–15)
AST SERPL-CCNC: 20 U/L (ref 1–40)
BILIRUB SERPL-MCNC: 0.4 MG/DL (ref 0–1.2)
BUN SERPL-MCNC: 22 MG/DL (ref 8–23)
BUN/CREAT SERPL: 17.6 (ref 7–25)
CALCIUM SPEC-SCNC: 9.6 MG/DL (ref 8.6–10.5)
CHLORIDE SERPL-SCNC: 103 MMOL/L (ref 98–107)
CO2 SERPL-SCNC: 27.1 MMOL/L (ref 22–29)
CREAT SERPL-MCNC: 1.25 MG/DL (ref 0.76–1.27)
EGFRCR SERPLBLD CKD-EPI 2021: 64.7 ML/MIN/1.73
GLOBULIN UR ELPH-MCNC: 2.1 GM/DL
GLUCOSE SERPL-MCNC: 290 MG/DL (ref 65–99)
HBA1C MFR BLD: 11.1 % (ref 4.8–5.6)
POTASSIUM SERPL-SCNC: 4.3 MMOL/L (ref 3.5–5.2)
PROT SERPL-MCNC: 6.1 G/DL (ref 6–8.5)
SODIUM SERPL-SCNC: 139 MMOL/L (ref 136–145)

## 2024-04-04 PROCEDURE — 80053 COMPREHEN METABOLIC PANEL: CPT

## 2024-04-04 PROCEDURE — 83036 HEMOGLOBIN GLYCOSYLATED A1C: CPT

## 2024-04-04 PROCEDURE — 36415 COLL VENOUS BLD VENIPUNCTURE: CPT

## 2024-04-22 ENCOUNTER — OFFICE VISIT (OUTPATIENT)
Dept: FAMILY MEDICINE CLINIC | Age: 64
End: 2024-04-22
Payer: COMMERCIAL

## 2024-04-22 VITALS
WEIGHT: 228 LBS | HEART RATE: 71 BPM | BODY MASS INDEX: 30.22 KG/M2 | OXYGEN SATURATION: 94 % | SYSTOLIC BLOOD PRESSURE: 129 MMHG | DIASTOLIC BLOOD PRESSURE: 82 MMHG | TEMPERATURE: 97.9 F | HEIGHT: 73 IN

## 2024-04-22 DIAGNOSIS — I10 ESSENTIAL (PRIMARY) HYPERTENSION: ICD-10-CM

## 2024-04-22 DIAGNOSIS — E11.8 TYPE 2 DIABETES MELLITUS WITH UNSPECIFIED COMPLICATIONS: ICD-10-CM

## 2024-04-22 DIAGNOSIS — E78.2 MIXED HYPERLIPIDEMIA: ICD-10-CM

## 2024-04-22 PROCEDURE — 99214 OFFICE O/P EST MOD 30 MIN: CPT | Performed by: NURSE PRACTITIONER

## 2024-04-22 RX ORDER — SITAGLIPTIN AND METFORMIN HYDROCHLORIDE 1000; 50 MG/1; MG/1
1 TABLET, FILM COATED, EXTENDED RELEASE ORAL DAILY
Qty: 90 TABLET | Refills: 0 | Status: SHIPPED | OUTPATIENT
Start: 2024-04-22 | End: 2024-04-26

## 2024-04-22 RX ORDER — LISINOPRIL 5 MG/1
5 TABLET ORAL DAILY
Qty: 90 TABLET | Refills: 0 | Status: SHIPPED | OUTPATIENT
Start: 2024-04-22

## 2024-04-22 RX ORDER — SEMAGLUTIDE 0.68 MG/ML
0.25 INJECTION, SOLUTION SUBCUTANEOUS WEEKLY
Qty: 6 ML | Refills: 0 | Status: SHIPPED | OUTPATIENT
Start: 2024-04-22

## 2024-04-22 RX ORDER — PRAVASTATIN SODIUM 20 MG
20 TABLET ORAL DAILY
Qty: 90 TABLET | Refills: 0 | Status: SHIPPED | OUTPATIENT
Start: 2024-04-22

## 2024-04-22 NOTE — ASSESSMENT & PLAN NOTE
Will start Ozempic. Potential SE s discussed. Will continue efforts with diet and exercise. Schedule will be changing soon which may help.

## 2024-04-22 NOTE — PROGRESS NOTES
Chief Complaint  Axel Simeon presents to [facility] for Diabetes    Subjective          History of Present Illness    Axel is following up on diabetes today. Last A1C was 11.1. Currently taking Humalog 75/25 15 units BID and Janumet XR 50/1000 mg once daily. Also on Lisinopril. Has previously met with dietician/diabetes educator. UTD eye exam. Also on pravastatin 20 mg daily for hyperlipidemia.    He has only had 3 days off since his last appointment. He knows that this is affecting his diet and exercise as eating fast meals and not going to the gym. Interested in GLP-1s.   Patient doesn't have a history of pancreatitis, family history of MEN syndrome, thyroid cancer, adrenal or pancreatic cancer, suicidal thoughts. Patient understands this may cause gi upset or pancreatitis.  Had mole removed. Has appt scheduled for follow up with dermatology later this week.     Review of Systems      No Known Allergies   Past Medical History:   Diagnosis Date    Coronavirus infection, unspecified     Diverticulitis 2009    DR. GIORDANO    Essential (primary) hypertension     Hyperlipidemia, unspecified     Type 2 diabetes mellitus with unspecified complications      Current Outpatient Medications   Medication Sig Dispense Refill    HumaLOG Mix 75/25 KwikPen (75-25) 100 UNIT/ML suspension pen-injector pen INJECT 15 UNITS UNDER THE SKIN INTO THE APPROPRIATE AREA AS DIRECTED 2 TIMES A DAY WITH MEALS 30 mL 0    lisinopril (PRINIVIL,ZESTRIL) 5 MG tablet Take 1 tablet by mouth Daily. 90 tablet 0    pravastatin (PRAVACHOL) 20 MG tablet Take 1 tablet by mouth Daily. 90 tablet 0    SITagliptin-metFORMIN HCl ER (Janumet XR)  MG tablet Take 1 tablet by mouth Daily. 90 tablet 0    Semaglutide,0.25 or 0.5MG/DOS, (Ozempic, 0.25 or 0.5 MG/DOSE,) 2 MG/3ML solution pen-injector Inject 0.25 mg under the skin into the appropriate area as directed 1 (One) Time Per Week. 6 mL 0     No current  "facility-administered medications for this visit.     Past Surgical History:   Procedure Laterality Date    COLONOSCOPY      INTENSE REMOVAL FOR DIVERTICULITIS IN 2009       Social History     Tobacco Use    Smoking status: Never     Passive exposure: Never    Smokeless tobacco: Never   Vaping Use    Vaping status: Never Used   Substance Use Topics    Alcohol use: Yes     Comment: 1x monthly    Drug use: Never     History reviewed. No pertinent family history.  Health Maintenance Due   Topic Date Due    LIPID PANEL  04/06/2024      Immunization History   Administered Date(s) Administered    COVID-19 (JUAN) 08/13/2021        Objective     Vitals:    04/22/24 1601   BP: 129/82   BP Location: Right arm   Patient Position: Sitting   Cuff Size: Large Adult   Pulse: 71   Temp: 97.9 °F (36.6 °C)   TempSrc: Oral   SpO2: 94%   Weight: 103 kg (228 lb)   Height: 185.4 cm (72.99\")     Body mass index is 30.09 kg/m².             No results found.    Physical Exam  Vitals reviewed.   Constitutional:       General: He is not in acute distress.     Appearance: Normal appearance. He is well-developed.   HENT:      Head: Normocephalic and atraumatic.   Cardiovascular:      Rate and Rhythm: Normal rate and regular rhythm.   Pulmonary:      Effort: Pulmonary effort is normal.      Breath sounds: Normal breath sounds.   Neurological:      Mental Status: He is alert and oriented to person, place, and time.   Psychiatric:         Mood and Affect: Mood and affect normal.           Result Review :                               Assessment and Plan      Assessment & Plan  Type 2 diabetes mellitus with unspecified complications    Will start Ozempic. Potential SE s discussed. Will continue efforts with diet and exercise. Schedule will be changing soon which may help.   Essential (primary) hypertension    Medical condition is stable.  Continue same therapy.  Will recheck at next regular appointment    Mixed hyperlipidemia     Medical " condition is stable.  Continue same therapy.  Will recheck at next regular appointment      Orders Placed This Encounter   Procedures    Comprehensive Metabolic Panel    Lipid Panel    Hemoglobin A1c    Microalbumin / Creatinine Urine Ratio - Urine, Clean Catch     New Medications Ordered This Visit   Medications    lisinopril (PRINIVIL,ZESTRIL) 5 MG tablet     Sig: Take 1 tablet by mouth Daily.     Dispense:  90 tablet     Refill:  0    pravastatin (PRAVACHOL) 20 MG tablet     Sig: Take 1 tablet by mouth Daily.     Dispense:  90 tablet     Refill:  0    SITagliptin-metFORMIN HCl ER (Janumet XR)  MG tablet     Sig: Take 1 tablet by mouth Daily.     Dispense:  90 tablet     Refill:  0    Semaglutide,0.25 or 0.5MG/DOS, (Ozempic, 0.25 or 0.5 MG/DOSE,) 2 MG/3ML solution pen-injector     Sig: Inject 0.25 mg under the skin into the appropriate area as directed 1 (One) Time Per Week.     Dispense:  6 mL     Refill:  0             Follow Up     No follow-ups on file.

## 2024-04-24 ENCOUNTER — PRIOR AUTHORIZATION (OUTPATIENT)
Dept: FAMILY MEDICINE CLINIC | Age: 64
End: 2024-04-24
Payer: COMMERCIAL

## 2024-04-24 NOTE — TELEPHONE ENCOUNTER
[facility]    Key: [id number]

## 2024-06-13 DIAGNOSIS — E11.8 TYPE 2 DIABETES MELLITUS WITH UNSPECIFIED COMPLICATIONS: ICD-10-CM

## 2024-06-13 NOTE — TELEPHONE ENCOUNTER
Caller: Axel Simeon    Relationship: Self    Best call back number: [phone]     Requested Prescriptions:   Requested Prescriptions     Pending Prescriptions Disp Refills    Semaglutide,0.25 or 0.5MG/DOS, (Ozempic, 0.25 or 0.5 MG/DOSE,) 2 MG/3ML solution pen-injector 6 mL 0     Sig: Inject 0.25 mg under the skin into the appropriate area as directed 1 (One) Time Per Week.        Pharmacy where request should be sent: [place] [id number] [facility] [phone] [facility] [phone] [facility]     Last office visit with prescribing clinician: 4/22/2024   Last telemedicine visit with prescribing clinician: Visit date not found   Next office visit with prescribing clinician: 7/24/2024     Additional details provided by patient: PATIENT HAS WENT UP A DOSE TO 0.5     Does the patient have less than a 3 day supply:  [x] Yes  [] No    Would you like a call back once the refill request has been completed: [] Yes [x] No    If the office needs to give you a call back, can they leave a voicemail: [] Yes [x] No    Edd Virk   06/13/24 17:08 EDT

## 2024-06-14 RX ORDER — SEMAGLUTIDE 0.68 MG/ML
0.5 INJECTION, SOLUTION SUBCUTANEOUS WEEKLY
Qty: 9 ML | Refills: 0 | Status: SHIPPED | OUTPATIENT
Start: 2024-06-14

## 2024-06-14 NOTE — TELEPHONE ENCOUNTER
Pt completed 0.25mg x 4 weeks and has done 0.50mg x 2 doses and states he has no medication left.

## 2024-07-17 ENCOUNTER — LAB (OUTPATIENT)
Dept: LAB | Facility: HOSPITAL | Age: 64
End: 2024-07-17
Payer: COMMERCIAL

## 2024-07-17 DIAGNOSIS — E11.8 TYPE 2 DIABETES MELLITUS WITH UNSPECIFIED COMPLICATIONS: ICD-10-CM

## 2024-07-17 LAB
ALBUMIN SERPL-MCNC: 4 G/DL (ref 3.5–5.2)
ALBUMIN UR-MCNC: <1.2 MG/DL
ALBUMIN/GLOB SERPL: 1.7 G/DL
ALP SERPL-CCNC: 105 U/L (ref 39–117)
ALT SERPL W P-5'-P-CCNC: 20 U/L (ref 1–41)
ANION GAP SERPL CALCULATED.3IONS-SCNC: 10.3 MMOL/L (ref 5–15)
AST SERPL-CCNC: 18 U/L (ref 1–40)
BILIRUB SERPL-MCNC: 0.5 MG/DL (ref 0–1.2)
BUN SERPL-MCNC: 26 MG/DL (ref 8–23)
BUN/CREAT SERPL: 19.4 (ref 7–25)
CALCIUM SPEC-SCNC: 9.6 MG/DL (ref 8.6–10.5)
CHLORIDE SERPL-SCNC: 97 MMOL/L (ref 98–107)
CHOLEST SERPL-MCNC: 178 MG/DL (ref 0–200)
CO2 SERPL-SCNC: 25.7 MMOL/L (ref 22–29)
CREAT SERPL-MCNC: 1.34 MG/DL (ref 0.76–1.27)
CREAT UR-MCNC: 47.4 MG/DL
EGFRCR SERPLBLD CKD-EPI 2021: 59.5 ML/MIN/1.73
GLOBULIN UR ELPH-MCNC: 2.4 GM/DL
GLUCOSE SERPL-MCNC: 537 MG/DL (ref 65–99)
HBA1C MFR BLD: 10.6 % (ref 4.8–5.6)
HDLC SERPL-MCNC: 42 MG/DL (ref 40–60)
LDLC SERPL CALC-MCNC: 91 MG/DL (ref 0–100)
LDLC/HDLC SERPL: 1.96 {RATIO}
MICROALBUMIN/CREAT UR: NORMAL MG/G{CREAT}
POTASSIUM SERPL-SCNC: 4.6 MMOL/L (ref 3.5–5.2)
PROT SERPL-MCNC: 6.4 G/DL (ref 6–8.5)
SODIUM SERPL-SCNC: 133 MMOL/L (ref 136–145)
TRIGL SERPL-MCNC: 269 MG/DL (ref 0–150)
VLDLC SERPL-MCNC: 45 MG/DL (ref 5–40)

## 2024-07-17 PROCEDURE — 82043 UR ALBUMIN QUANTITATIVE: CPT

## 2024-07-17 PROCEDURE — 82570 ASSAY OF URINE CREATININE: CPT

## 2024-07-17 PROCEDURE — 80061 LIPID PANEL: CPT

## 2024-07-17 PROCEDURE — 36415 COLL VENOUS BLD VENIPUNCTURE: CPT

## 2024-07-17 PROCEDURE — 80053 COMPREHEN METABOLIC PANEL: CPT

## 2024-07-17 PROCEDURE — 83036 HEMOGLOBIN GLYCOSYLATED A1C: CPT

## 2024-07-24 ENCOUNTER — OFFICE VISIT (OUTPATIENT)
Dept: FAMILY MEDICINE CLINIC | Age: 64
End: 2024-07-24
Payer: COMMERCIAL

## 2024-07-24 VITALS
HEART RATE: 79 BPM | OXYGEN SATURATION: 98 % | HEIGHT: 73 IN | SYSTOLIC BLOOD PRESSURE: 114 MMHG | BODY MASS INDEX: 26.9 KG/M2 | TEMPERATURE: 97.8 F | DIASTOLIC BLOOD PRESSURE: 82 MMHG | WEIGHT: 203 LBS

## 2024-07-24 DIAGNOSIS — E78.2 MIXED HYPERLIPIDEMIA: ICD-10-CM

## 2024-07-24 DIAGNOSIS — I10 ESSENTIAL (PRIMARY) HYPERTENSION: ICD-10-CM

## 2024-07-24 DIAGNOSIS — E11.8 TYPE 2 DIABETES MELLITUS WITH UNSPECIFIED COMPLICATIONS: Primary | ICD-10-CM

## 2024-07-24 PROCEDURE — 99214 OFFICE O/P EST MOD 30 MIN: CPT | Performed by: NURSE PRACTITIONER

## 2024-07-24 RX ORDER — LISINOPRIL 5 MG/1
5 TABLET ORAL DAILY
Qty: 90 TABLET | Refills: 0 | Status: SHIPPED | OUTPATIENT
Start: 2024-07-24

## 2024-07-24 RX ORDER — PRAVASTATIN SODIUM 20 MG
20 TABLET ORAL DAILY
Qty: 90 TABLET | Refills: 0 | Status: SHIPPED | OUTPATIENT
Start: 2024-07-24

## 2024-07-24 RX ORDER — SEMAGLUTIDE 0.68 MG/ML
0.5 INJECTION, SOLUTION SUBCUTANEOUS WEEKLY
Qty: 9 ML | Refills: 0 | Status: SHIPPED | OUTPATIENT
Start: 2024-07-24

## 2024-07-24 NOTE — ASSESSMENT & PLAN NOTE
Medical condition is stable.  Continue same therapy.  Will recheck at next regular appointment     Yes

## 2024-07-24 NOTE — PROGRESS NOTES
Chief Complaint  Axel Simeon presents to [facility] for Diabetes    Subjective          History of Present Illness    Axel is following up on diabetes today. Recent A1C 10.6.  Currently taking Ozempic. He stopped taking his Humalog 75/25 and Janumet after starting the Ozempic as he wanted to see what the Ozempic was doing on its own. Also on Lisinopril. Has previously met with dietician/diabetes educator. UTD eye exam. Also on pravastatin 20 mg daily for hyperlipidemia. He is not currently checking his blood sugar. Has lost 25 pounds since his last visit.     Review of Systems      No Known Allergies   Past Medical History:   Diagnosis Date    Coronavirus infection, unspecified     Diverticulitis 2009    DR. GIORDANO    Essential (primary) hypertension     Hyperlipidemia, unspecified     Type 2 diabetes mellitus with unspecified complications      Current Outpatient Medications   Medication Sig Dispense Refill    lisinopril (PRINIVIL,ZESTRIL) 5 MG tablet Take 1 tablet by mouth Daily. 90 tablet 0    pravastatin (PRAVACHOL) 20 MG tablet Take 1 tablet by mouth Daily. 90 tablet 0    Semaglutide,0.25 or 0.5MG/DOS, (Ozempic, 0.25 or 0.5 MG/DOSE,) 2 MG/3ML solution pen-injector Inject 0.5 mg under the skin into the appropriate area as directed 1 (One) Time Per Week. 9 mL 0    Insulin Glargine (LANTUS SOLOSTAR) 100 UNIT/ML injection pen Inject 10 Units under the skin into the appropriate area as directed Daily. 30 mL 0     No current facility-administered medications for this visit.     Past Surgical History:   Procedure Laterality Date    COLONOSCOPY      INTENSE REMOVAL FOR DIVERTICULITIS IN 2009       Social History     Tobacco Use    Smoking status: Never     Passive exposure: Never    Smokeless tobacco: Never   Vaping Use    Vaping status: Never Used   Substance Use Topics    Alcohol use: Yes     Comment: 1x monthly    Drug use: Never     History reviewed. No pertinent  "family history.  There are no preventive care reminders to display for this patient.     Immunization History   Administered Date(s) Administered    COVID-19 (JUAN) 08/13/2021        Objective     Vitals:    07/24/24 1555   BP: 114/82   Pulse: 79   Temp: 97.8 °F (36.6 °C)   TempSrc: Oral   SpO2: 98%   Weight: 92.1 kg (203 lb)   Height: 185.4 cm (72.99\")     Body mass index is 26.79 kg/m².              No results found.    Physical Exam  Vitals reviewed.   Constitutional:       General: He is not in acute distress.     Appearance: Normal appearance. He is well-developed.   HENT:      Head: Normocephalic and atraumatic.   Cardiovascular:      Rate and Rhythm: Normal rate and regular rhythm.      Pulses:           Dorsalis pedis pulses are 2+ on the right side and 2+ on the left side.   Pulmonary:      Effort: Pulmonary effort is normal.      Breath sounds: Normal breath sounds.   Feet:      Right foot:      Protective Sensation: 3 sites tested.  3 sites sensed.      Skin integrity: Skin integrity normal. No ulcer or blister.      Toenail Condition: Right toenails are normal.      Left foot:      Protective Sensation: 3 sites tested.  3 sites sensed.      Skin integrity: Skin integrity normal. No ulcer or blister.      Toenail Condition: Left toenails are normal.      Comments: Diabetic Foot Exam Performed and Monofilament Test Performed     Neurological:      Mental Status: He is alert and oriented to person, place, and time.   Psychiatric:         Mood and Affect: Mood and affect normal.           Result Review :                               Assessment and Plan      Assessment & Plan  Type 2 diabetes mellitus with unspecified complications    He will continue Ozempic at current dose. Will start long acting insulin. Will start Lantus at 2 units daily. Will check AM blood sugar and increase by 3 units every 3 days until fasting blood sugar 140. He will send me readings of his fasting blood sugar next week. Let me " know of concerns.   Mixed hyperlipidemia     Medical condition is stable.  Continue same therapy.  Will recheck at next regular appointment    Essential (primary) hypertension  Hypertension is stable and controlled  Continue current treatment regimen.  Blood pressure will be reassessed in 3 months.     New Medications Ordered This Visit   Medications    Insulin Glargine (LANTUS SOLOSTAR) 100 UNIT/ML injection pen     Sig: Inject 10 Units under the skin into the appropriate area as directed Daily.     Dispense:  30 mL     Refill:  0    Semaglutide,0.25 or 0.5MG/DOS, (Ozempic, 0.25 or 0.5 MG/DOSE,) 2 MG/3ML solution pen-injector     Sig: Inject 0.5 mg under the skin into the appropriate area as directed 1 (One) Time Per Week.     Dispense:  9 mL     Refill:  0    pravastatin (PRAVACHOL) 20 MG tablet     Sig: Take 1 tablet by mouth Daily.     Dispense:  90 tablet     Refill:  0    lisinopril (PRINIVIL,ZESTRIL) 5 MG tablet     Sig: Take 1 tablet by mouth Daily.     Dispense:  90 tablet     Refill:  0             Follow Up     Return in about 3 months (around 10/24/2024).

## 2024-07-24 NOTE — ASSESSMENT & PLAN NOTE
He will continue Ozempic at current dose. Will start long acting insulin. Will start Lantus at 2 units daily. Will check AM blood sugar and increase by 3 units every 3 days until fasting blood sugar 140. He will send me readings of his fasting blood sugar next week. Let me know of concerns.

## 2024-09-16 DIAGNOSIS — E11.8 TYPE 2 DIABETES MELLITUS WITH UNSPECIFIED COMPLICATIONS: ICD-10-CM

## 2024-09-17 RX ORDER — SEMAGLUTIDE 0.68 MG/ML
0.5 INJECTION, SOLUTION SUBCUTANEOUS WEEKLY
Qty: 9 ML | Refills: 0 | Status: SHIPPED | OUTPATIENT
Start: 2024-09-17

## 2024-10-24 ENCOUNTER — TELEPHONE (OUTPATIENT)
Dept: FAMILY MEDICINE CLINIC | Age: 64
End: 2024-10-24
Payer: COMMERCIAL

## 2024-10-24 DIAGNOSIS — E11.8 TYPE 2 DIABETES MELLITUS WITH UNSPECIFIED COMPLICATIONS: ICD-10-CM

## 2024-10-24 NOTE — TELEPHONE ENCOUNTER
Caller: Axel Simeon    Relationship: Self    Best call back number: [phone]    Requested Prescriptions:   Insulin Glargine (LANTUS SOLOSTAR) 100 UNIT/ML injection pen        Pharmacy where request should be sent:    [facility] [address] [facility] - [phone]  - [phone]  [phone]       Last office visit with prescribing clinician: 7/24/2024   Last telemedicine visit with prescribing clinician: Visit date not found   Next office visit with prescribing clinician: 10/29/2024     Additional details provided by patient: PATIENT SAID HE IS USING ABOUT 30 UNITS AND THAT IS WHY HE HAS RAN OUT OF MEDICATION    Does the patient have less than a 3 day supply:  [x] Yes  [] No    Would you like a call back once the refill request has been completed: [] Yes [] No    If the office needs to give you a call back, can they leave a voicemail: [] Yes [] No    Edd Felipe Rep   10/24/24 11:32 EDT

## 2024-10-28 ENCOUNTER — TELEPHONE (OUTPATIENT)
Dept: FAMILY MEDICINE CLINIC | Age: 64
End: 2024-10-28
Payer: COMMERCIAL

## 2024-10-28 NOTE — TELEPHONE ENCOUNTER
Caller: Axel Simeon    Relationship: Self    Best call back number: [phone]    What orders are you requesting (i.e. lab or imaging): BLOOD LABS FOR APPT TOMORROW    In what timeframe would the patient need to come in: TODAY    Where will you receive your lab/imaging services: [facility]    Additional notes: PATIENT WOULD LIKE TO HAVE HIS LABS DRAWN TODAY FOR HIS APPT TOMORROW 10/29/2024. PLEASE PUT ORDERS IN SYSTEM AND CALL PATIENT WHEN THEY ARE THERE.

## 2024-10-29 ENCOUNTER — OFFICE VISIT (OUTPATIENT)
Dept: FAMILY MEDICINE CLINIC | Age: 64
End: 2024-10-29
Payer: COMMERCIAL

## 2024-10-29 VITALS
BODY MASS INDEX: 27.36 KG/M2 | TEMPERATURE: 97.9 F | WEIGHT: 206.45 LBS | SYSTOLIC BLOOD PRESSURE: 135 MMHG | HEART RATE: 68 BPM | HEIGHT: 73 IN | OXYGEN SATURATION: 100 % | DIASTOLIC BLOOD PRESSURE: 83 MMHG

## 2024-10-29 DIAGNOSIS — E11.8 TYPE 2 DIABETES MELLITUS WITH UNSPECIFIED COMPLICATIONS: Primary | ICD-10-CM

## 2024-10-29 DIAGNOSIS — Z12.5 SCREENING FOR MALIGNANT NEOPLASM OF PROSTATE: ICD-10-CM

## 2024-10-29 LAB
EXPIRATION DATE: ABNORMAL
HBA1C MFR BLD: 9 % (ref 4.5–5.7)
Lab: ABNORMAL

## 2024-10-29 PROCEDURE — 83036 HEMOGLOBIN GLYCOSYLATED A1C: CPT | Performed by: NURSE PRACTITIONER

## 2024-10-29 PROCEDURE — 99214 OFFICE O/P EST MOD 30 MIN: CPT | Performed by: NURSE PRACTITIONER

## 2024-10-29 NOTE — PROGRESS NOTES
Chief Complaint  Axel Simeon presents to [facility] for Diabetes (3 month follow up )    Subjective          History of Present Illness    Axel is following up on diabetes today. Last A1C 10.6.  Current medication is Ozempic and Lantus. Also on Lisinopril and hyperlipidemia. Has previously met with dietician/diabetes educator. UTD eye exam.     Review of Systems      No Known Allergies   Past Medical History:   Diagnosis Date    Coronavirus infection, unspecified     Diverticulitis 2009    DR. GIORDANO    Essential (primary) hypertension     Hyperlipidemia, unspecified     Type 2 diabetes mellitus with unspecified complications      Current Outpatient Medications   Medication Sig Dispense Refill    Insulin Glargine (LANTUS SOLOSTAR) 100 UNIT/ML injection pen Inject 40 Units under the skin into the appropriate area as directed Daily. 40 mL 0    lisinopril (PRINIVIL,ZESTRIL) 5 MG tablet Take 1 tablet by mouth Daily. 90 tablet 0    pravastatin (PRAVACHOL) 20 MG tablet Take 1 tablet by mouth Daily. 90 tablet 0    Semaglutide,0.25 or 0.5MG/DOS, (Ozempic, 0.25 or 0.5 MG/DOSE,) 2 MG/3ML solution pen-injector Inject 0.5 mg under the skin into the appropriate area as directed 1 (One) Time Per Week. 9 mL 0     No current facility-administered medications for this visit.     Past Surgical History:   Procedure Laterality Date    COLONOSCOPY      INTENSE REMOVAL FOR DIVERTICULITIS IN 2009       Social History     Tobacco Use    Smoking status: Never     Passive exposure: Never    Smokeless tobacco: Never   Vaping Use    Vaping status: Never Used   Substance Use Topics    Alcohol use: Yes     Comment: 1x monthly    Drug use: Never     History reviewed. No pertinent family history.  There are no preventive care reminders to display for this patient.     Immunization History   Administered Date(s) Administered    COVID-19 ([facility]) 08/13/2021        Objective     Vitals:    10/29/24  "1605 10/29/24 1606   BP: 144/83 135/83   BP Location: Left arm Right arm   Patient Position: Sitting Sitting   Pulse: 68 68   Temp: 97.9 °F (36.6 °C)    TempSrc: Oral    SpO2: 100%    Weight: 93.6 kg (206 lb 7.2 oz)    Height: 185.4 cm (72.99\")      Body mass index is 27.24 kg/m².                No results found.    Physical Exam  Vitals reviewed.   Constitutional:       General: He is not in acute distress.     Appearance: Normal appearance. He is well-developed.   HENT:      Head: Normocephalic and atraumatic.   Cardiovascular:      Rate and Rhythm: Normal rate and regular rhythm.   Pulmonary:      Effort: Pulmonary effort is normal.      Breath sounds: Normal breath sounds.   Neurological:      Mental Status: He is alert and oriented to person, place, and time.   Psychiatric:         Mood and Affect: Mood and affect normal.           Result Review :     The following data was reviewed by: JITENDRA Osorio on 10/29/2024:          POC Glycosylated Hemoglobin (Hb A1C) (10/29/2024 16:32)                   Assessment and Plan      Assessment & Plan  Type 2 diabetes mellitus with unspecified complications  A1C improved to 9.0. Will continue Ozempic and lantus. Will increase lantus by 2 units every 2 days. Will continue to monitor blood sugars and will stop increase if BG consistently 140s or under on checks. New rx sent for 40 units. Also discussed splitting lantus dose to twice daily to see if that gives better control of BG.     Orders:    POC Glycosylated Hemoglobin (Hb A1C)    Insulin Glargine (LANTUS SOLOSTAR) 100 UNIT/ML injection pen; Inject 40 Units under the skin into the appropriate area as directed Daily.    Comprehensive Metabolic Panel; Future    Hemoglobin A1c; Future    CBC No Differential; Future    Screening for malignant neoplasm of prostate  PSA lab ordered    Orders:    PSA SCREENING; Future              Follow Up     Return in about 3 months (around 1/29/2025) for Annual physical.             "

## 2024-10-29 NOTE — ASSESSMENT & PLAN NOTE
A1C improved to 9.0. Will continue Ozempic and lantus. Will increase lantus by 2 units every 2 days. Will continue to monitor blood sugars and will stop increase if BG consistently 140s or under on checks. New rx sent for 40 units. Also discussed splitting lantus dose to twice daily to see if that gives better control of BG.     Orders:    POC Glycosylated Hemoglobin (Hb A1C)    Insulin Glargine (LANTUS SOLOSTAR) 100 UNIT/ML injection pen; Inject 40 Units under the skin into the appropriate area as directed Daily.    Comprehensive Metabolic Panel; Future    Hemoglobin A1c; Future    CBC No Differential; Future

## 2024-11-11 DIAGNOSIS — E78.2 MIXED HYPERLIPIDEMIA: ICD-10-CM

## 2024-11-11 DIAGNOSIS — I10 ESSENTIAL (PRIMARY) HYPERTENSION: ICD-10-CM

## 2024-11-11 RX ORDER — LISINOPRIL 5 MG/1
5 TABLET ORAL DAILY
Qty: 90 TABLET | Refills: 0 | Status: SHIPPED | OUTPATIENT
Start: 2024-11-11

## 2024-11-11 RX ORDER — PRAVASTATIN SODIUM 20 MG
20 TABLET ORAL DAILY
Qty: 90 TABLET | Refills: 0 | Status: SHIPPED | OUTPATIENT
Start: 2024-11-11

## 2024-12-06 DIAGNOSIS — E11.8 TYPE 2 DIABETES MELLITUS WITH UNSPECIFIED COMPLICATIONS: ICD-10-CM

## 2024-12-06 RX ORDER — SEMAGLUTIDE 0.68 MG/ML
INJECTION, SOLUTION SUBCUTANEOUS
Qty: 9 ML | Refills: 0 | Status: SHIPPED | OUTPATIENT
Start: 2024-12-06

## 2025-01-27 ENCOUNTER — LAB (OUTPATIENT)
Dept: LAB | Facility: HOSPITAL | Age: 65
End: 2025-01-27
Payer: COMMERCIAL

## 2025-01-27 DIAGNOSIS — Z12.5 SCREENING FOR MALIGNANT NEOPLASM OF PROSTATE: ICD-10-CM

## 2025-01-27 DIAGNOSIS — E11.8 TYPE 2 DIABETES MELLITUS WITH UNSPECIFIED COMPLICATIONS: ICD-10-CM

## 2025-01-27 LAB
ALBUMIN SERPL-MCNC: 3.9 G/DL (ref 3.5–5.2)
ALBUMIN/GLOB SERPL: 1.4 G/DL
ALP SERPL-CCNC: 85 U/L (ref 39–117)
ALT SERPL W P-5'-P-CCNC: 27 U/L (ref 1–41)
ANION GAP SERPL CALCULATED.3IONS-SCNC: 11 MMOL/L (ref 5–15)
AST SERPL-CCNC: 25 U/L (ref 1–40)
BILIRUB SERPL-MCNC: 0.4 MG/DL (ref 0–1.2)
BUN SERPL-MCNC: 26 MG/DL (ref 8–23)
BUN/CREAT SERPL: 17.3 (ref 7–25)
CALCIUM SPEC-SCNC: 9.4 MG/DL (ref 8.6–10.5)
CHLORIDE SERPL-SCNC: 104 MMOL/L (ref 98–107)
CO2 SERPL-SCNC: 27 MMOL/L (ref 22–29)
CREAT SERPL-MCNC: 1.5 MG/DL (ref 0.76–1.27)
DEPRECATED RDW RBC AUTO: 38 FL (ref 37–54)
EGFRCR SERPLBLD CKD-EPI 2021: 51.7 ML/MIN/1.73
ERYTHROCYTE [DISTWIDTH] IN BLOOD BY AUTOMATED COUNT: 12.3 % (ref 12.3–15.4)
GLOBULIN UR ELPH-MCNC: 2.7 GM/DL
GLUCOSE SERPL-MCNC: 172 MG/DL (ref 65–99)
HBA1C MFR BLD: 7.6 % (ref 4.8–5.6)
HCT VFR BLD AUTO: 42.8 % (ref 37.5–51)
HGB BLD-MCNC: 14.7 G/DL (ref 13–17.7)
MCH RBC QN AUTO: 29.5 PG (ref 26.6–33)
MCHC RBC AUTO-ENTMCNC: 34.3 G/DL (ref 31.5–35.7)
MCV RBC AUTO: 85.9 FL (ref 79–97)
PLATELET # BLD AUTO: 240 10*3/MM3 (ref 140–450)
PMV BLD AUTO: 10.4 FL (ref 6–12)
POTASSIUM SERPL-SCNC: 4.2 MMOL/L (ref 3.5–5.2)
PROT SERPL-MCNC: 6.6 G/DL (ref 6–8.5)
PSA SERPL-MCNC: 1.57 NG/ML (ref 0–4)
RBC # BLD AUTO: 4.98 10*6/MM3 (ref 4.14–5.8)
SODIUM SERPL-SCNC: 142 MMOL/L (ref 136–145)
WBC NRBC COR # BLD AUTO: 6.35 10*3/MM3 (ref 3.4–10.8)

## 2025-01-27 PROCEDURE — 80053 COMPREHEN METABOLIC PANEL: CPT

## 2025-01-27 PROCEDURE — 85027 COMPLETE CBC AUTOMATED: CPT

## 2025-01-27 PROCEDURE — G0103 PSA SCREENING: HCPCS

## 2025-01-27 PROCEDURE — 83036 HEMOGLOBIN GLYCOSYLATED A1C: CPT

## 2025-01-27 PROCEDURE — 36415 COLL VENOUS BLD VENIPUNCTURE: CPT

## 2025-01-31 ENCOUNTER — OFFICE VISIT (OUTPATIENT)
Dept: FAMILY MEDICINE CLINIC | Age: 65
End: 2025-01-31
Payer: COMMERCIAL

## 2025-01-31 VITALS
TEMPERATURE: 97.9 F | SYSTOLIC BLOOD PRESSURE: 132 MMHG | BODY MASS INDEX: 28.55 KG/M2 | HEART RATE: 80 BPM | HEIGHT: 73 IN | WEIGHT: 215.4 LBS | OXYGEN SATURATION: 96 % | DIASTOLIC BLOOD PRESSURE: 84 MMHG

## 2025-01-31 DIAGNOSIS — R94.4 ABNORMAL RENAL FUNCTION TEST: ICD-10-CM

## 2025-01-31 DIAGNOSIS — I10 ESSENTIAL (PRIMARY) HYPERTENSION: ICD-10-CM

## 2025-01-31 DIAGNOSIS — E78.2 MIXED HYPERLIPIDEMIA: ICD-10-CM

## 2025-01-31 DIAGNOSIS — Z00.00 ANNUAL PHYSICAL EXAM: ICD-10-CM

## 2025-01-31 DIAGNOSIS — E11.8 TYPE 2 DIABETES MELLITUS WITH UNSPECIFIED COMPLICATIONS: ICD-10-CM

## 2025-01-31 PROCEDURE — 99396 PREV VISIT EST AGE 40-64: CPT | Performed by: NURSE PRACTITIONER

## 2025-01-31 PROCEDURE — 99214 OFFICE O/P EST MOD 30 MIN: CPT | Performed by: NURSE PRACTITIONER

## 2025-01-31 RX ORDER — LISINOPRIL 5 MG/1
5 TABLET ORAL DAILY
Qty: 90 TABLET | Refills: 0 | Status: SHIPPED | OUTPATIENT
Start: 2025-01-31

## 2025-01-31 RX ORDER — PRAVASTATIN SODIUM 20 MG
20 TABLET ORAL DAILY
Qty: 90 TABLET | Refills: 0 | Status: SHIPPED | OUTPATIENT
Start: 2025-01-31

## 2025-01-31 NOTE — ASSESSMENT & PLAN NOTE
Hypertension is stable and controlled  Continue current treatment regimen.  Blood pressure will be reassessed in 3 months.    Orders:    lisinopril (PRINIVIL,ZESTRIL) 5 MG tablet; Take 1 tablet by mouth Daily.

## 2025-01-31 NOTE — PROGRESS NOTES
Chief Complaint  Axel Simeon presents to [facility] for Annual Exam    Subjective          History of Present Illness    Axel is here today for annual preventive exam.  Declines influenza, Tdap, PNA, zoster vaccines.  Had normal colonoscopy on 11/21/2016. Advised 10 year repeat.   Normal PSA 1/27/2025.  Never smoker.    Also following up on diabetes today. Recent A1C 7.6 which was an improvement from prior 9.0, 10.6, 11.1.  Current medication is Ozempic and Lantus. He would like to increase his Ozempic to 1 mg. Reports BG was running 120s over the last few months until the last few weeks, he has been running 160s-180s. His activity level has been less. Has previously met with dietician/diabetes educator. UTD eye exam with Maurice. Also on Lisinopril and pravastatin 20 mg daily for HTN and hyperlipidemia. Had recent labs with abnormal kidney function.     Review of Systems   Constitutional:  Negative for chills and fever.   HENT:  Negative for ear pain and sore throat.    Eyes:  Negative for blurred vision and redness.   Respiratory:  Negative for shortness of breath and wheezing.    Cardiovascular:  Negative for chest pain and palpitations.   Gastrointestinal:  Negative for abdominal pain and vomiting.   Genitourinary:  Negative for frequency and urgency.   Skin:  Negative for rash.   Neurological:  Negative for seizures and syncope.   Psychiatric/Behavioral:  Negative for suicidal ideas and depressed mood.          No Known Allergies   Past Medical History:   Diagnosis Date    Coronavirus infection, unspecified     Diverticulitis 2009    DR. GIORDANO    Essential (primary) hypertension     Hyperlipidemia, unspecified     Type 2 diabetes mellitus with unspecified complications      Current Outpatient Medications   Medication Sig Dispense Refill    Insulin Glargine (LANTUS SOLOSTAR) 100 UNIT/ML injection pen Inject 40 Units under the skin into the appropriate area as directed  "Daily. 40 mL 0    lisinopril (PRINIVIL,ZESTRIL) 5 MG tablet Take 1 tablet by mouth Daily. 90 tablet 0    pravastatin (PRAVACHOL) 20 MG tablet Take 1 tablet by mouth Daily. 90 tablet 0    Semaglutide, 1 MG/DOSE, (OZEMPIC) 4 MG/3ML solution pen-injector Inject 1 mg under the skin into the appropriate area as directed 1 (One) Time Per Week. 9 mL 1     No current facility-administered medications for this visit.     Past Surgical History:   Procedure Laterality Date    COLONOSCOPY      INTENSE REMOVAL FOR DIVERTICULITIS IN 2009       Social History     Tobacco Use    Smoking status: Never     Passive exposure: Never    Smokeless tobacco: Never   Vaping Use    Vaping status: Never Used   Substance Use Topics    Alcohol use: Yes     Comment: 1x monthly    Drug use: Never     History reviewed. No pertinent family history.  Health Maintenance Due   Topic Date Due    ANNUAL PHYSICAL  01/22/2025      Immunization History   Administered Date(s) Administered    COVID-19 ([employer]) 08/13/2021        Objective     Vitals:    01/31/25 1612 01/31/25 1633   BP: 154/81 132/84   Pulse: 80    Temp: 97.9 °F (36.6 °C)    TempSrc: Oral    SpO2: 96%    Weight: 97.7 kg (215 lb 6.4 oz)    Height: 185.4 cm (72.99\")      Body mass index is 28.43 kg/m².                No results found.    Physical Exam  Vitals reviewed.   Constitutional:       General: He is not in acute distress.     Appearance: Normal appearance.   HENT:      Head: Normocephalic and atraumatic.      Right Ear: Hearing, tympanic membrane and ear canal normal.      Left Ear: Hearing, tympanic membrane and ear canal normal.      Mouth/Throat:      Mouth: Mucous membranes are moist.   Eyes:      Extraocular Movements: Extraocular movements intact.      Pupils: Pupils are equal, round, and reactive to light.   Cardiovascular:      Rate and Rhythm: Normal rate and regular rhythm.   Pulmonary:      Effort: Pulmonary effort is normal. No respiratory distress.      Breath " sounds: Normal breath sounds.   Abdominal:      General: Bowel sounds are normal.      Palpations: Abdomen is soft.      Tenderness: There is no abdominal tenderness.   Musculoskeletal:         General: Normal range of motion.      Cervical back: Normal range of motion and neck supple.   Skin:     General: Skin is warm and dry.   Neurological:      Mental Status: He is alert and oriented to person, place, and time.   Psychiatric:         Mood and Affect: Mood normal.           Result Review :     The following data was reviewed by: JITENDRA Osorio on 01/31/2025:          Hemoglobin A1c (01/27/2025 16:07)  Comprehensive Metabolic Panel (01/27/2025 16:07)  PSA SCREENING (01/27/2025 16:07)  CBC No Differential (01/27/2025 16:07)                Assessment and Plan      Assessment & Plan  Annual physical exam  Appropriate screenings and vaccinations were reviewed with the pt and offered as indicated.  Pt counseled on healthy lifestyle including healthy diet, exercise.    Orders:    Comprehensive Metabolic Panel; Future    Lipid Panel; Future    Type 2 diabetes mellitus with unspecified complications    Will increase ozempic to 1 mg. Continue lantus and monitoring BG. Continue efforts with diabetic diet and exercise. Will return for fasting labs prior to next visit.   Orders:    Semaglutide, 1 MG/DOSE, (OZEMPIC) 4 MG/3ML solution pen-injector; Inject 1 mg under the skin into the appropriate area as directed 1 (One) Time Per Week.    Insulin Glargine (LANTUS SOLOSTAR) 100 UNIT/ML injection pen; Inject 40 Units under the skin into the appropriate area as directed Daily.    Hemoglobin A1c; Future    Microalbumin / Creatinine Urine Ratio - Urine, Clean Catch; Future    Essential (primary) hypertension  Hypertension is stable and controlled  Continue current treatment regimen.  Blood pressure will be reassessed in 3 months.    Orders:    lisinopril (PRINIVIL,ZESTRIL) 5 MG tablet; Take 1 tablet by mouth Daily.    Mixed  hyperlipidemia     Medical condition is stable.  Continue same therapy.  Will recheck at next regular appointment    Orders:    pravastatin (PRAVACHOL) 20 MG tablet; Take 1 tablet by mouth Daily.    Abnormal renal function test  Discussed lab results and that diabetes can potentially affect kidney function. He will increase his water intake. Will continue to monitor.                  Follow Up     Return in about 3 months (around 4/30/2025) for Recheck.

## 2025-01-31 NOTE — ASSESSMENT & PLAN NOTE
Will increase ozempic to 1 mg. Continue lantus and monitoring BG. Continue efforts with diabetic diet and exercise. Will return for fasting labs prior to next visit.   Orders:    Semaglutide, 1 MG/DOSE, (OZEMPIC) 4 MG/3ML solution pen-injector; Inject 1 mg under the skin into the appropriate area as directed 1 (One) Time Per Week.    Insulin Glargine (LANTUS SOLOSTAR) 100 UNIT/ML injection pen; Inject 40 Units under the skin into the appropriate area as directed Daily.    Hemoglobin A1c; Future    Microalbumin / Creatinine Urine Ratio - Urine, Clean Catch; Future

## 2025-01-31 NOTE — ASSESSMENT & PLAN NOTE
Discussed lab results and that diabetes can potentially affect kidney function. He will increase his water intake. Will continue to monitor.

## 2025-01-31 NOTE — ASSESSMENT & PLAN NOTE
Medical condition is stable.  Continue same therapy.  Will recheck at next regular appointment    Orders:    pravastatin (PRAVACHOL) 20 MG tablet; Take 1 tablet by mouth Daily.

## 2025-04-25 ENCOUNTER — LAB (OUTPATIENT)
Dept: LAB | Facility: HOSPITAL | Age: 65
End: 2025-04-25
Payer: COMMERCIAL

## 2025-04-25 DIAGNOSIS — Z00.00 ANNUAL PHYSICAL EXAM: ICD-10-CM

## 2025-04-25 DIAGNOSIS — E11.8 TYPE 2 DIABETES MELLITUS WITH UNSPECIFIED COMPLICATIONS: ICD-10-CM

## 2025-04-25 LAB
ALBUMIN SERPL-MCNC: 4.1 G/DL (ref 3.5–5.2)
ALBUMIN UR-MCNC: <1.2 MG/DL
ALBUMIN/GLOB SERPL: 1.5 G/DL
ALP SERPL-CCNC: 79 U/L (ref 39–117)
ALT SERPL W P-5'-P-CCNC: 20 U/L (ref 1–41)
ANION GAP SERPL CALCULATED.3IONS-SCNC: 10 MMOL/L (ref 5–15)
AST SERPL-CCNC: 26 U/L (ref 1–40)
BILIRUB SERPL-MCNC: 0.6 MG/DL (ref 0–1.2)
BUN SERPL-MCNC: 24 MG/DL (ref 8–23)
BUN/CREAT SERPL: 17.4 (ref 7–25)
CALCIUM SPEC-SCNC: 9.6 MG/DL (ref 8.6–10.5)
CHLORIDE SERPL-SCNC: 105 MMOL/L (ref 98–107)
CHOLEST SERPL-MCNC: 184 MG/DL (ref 0–200)
CO2 SERPL-SCNC: 28 MMOL/L (ref 22–29)
CREAT SERPL-MCNC: 1.38 MG/DL (ref 0.76–1.27)
CREAT UR-MCNC: 148.1 MG/DL
EGFRCR SERPLBLD CKD-EPI 2021: 57.1 ML/MIN/1.73
GLOBULIN UR ELPH-MCNC: 2.8 GM/DL
GLUCOSE SERPL-MCNC: 83 MG/DL (ref 65–99)
HBA1C MFR BLD: 6.6 % (ref 4.8–5.6)
HDLC SERPL-MCNC: 49 MG/DL (ref 40–60)
LDLC SERPL CALC-MCNC: 115 MG/DL (ref 0–100)
LDLC/HDLC SERPL: 2.31 {RATIO}
MICROALBUMIN/CREAT UR: NORMAL MG/G{CREAT}
POTASSIUM SERPL-SCNC: 4.7 MMOL/L (ref 3.5–5.2)
PROT SERPL-MCNC: 6.9 G/DL (ref 6–8.5)
SODIUM SERPL-SCNC: 143 MMOL/L (ref 136–145)
TRIGL SERPL-MCNC: 108 MG/DL (ref 0–150)
VLDLC SERPL-MCNC: 20 MG/DL (ref 5–40)

## 2025-04-25 PROCEDURE — 83036 HEMOGLOBIN GLYCOSYLATED A1C: CPT

## 2025-04-25 PROCEDURE — 82043 UR ALBUMIN QUANTITATIVE: CPT

## 2025-04-25 PROCEDURE — 80061 LIPID PANEL: CPT

## 2025-04-25 PROCEDURE — 82570 ASSAY OF URINE CREATININE: CPT

## 2025-04-25 PROCEDURE — 80053 COMPREHEN METABOLIC PANEL: CPT

## 2025-04-25 PROCEDURE — 36415 COLL VENOUS BLD VENIPUNCTURE: CPT

## 2025-04-30 ENCOUNTER — OFFICE VISIT (OUTPATIENT)
Dept: FAMILY MEDICINE CLINIC | Age: 65
End: 2025-04-30
Payer: COMMERCIAL

## 2025-04-30 VITALS
DIASTOLIC BLOOD PRESSURE: 82 MMHG | BODY MASS INDEX: 27.96 KG/M2 | HEART RATE: 65 BPM | OXYGEN SATURATION: 97 % | WEIGHT: 211 LBS | SYSTOLIC BLOOD PRESSURE: 148 MMHG | TEMPERATURE: 98 F | HEIGHT: 73 IN

## 2025-04-30 DIAGNOSIS — E78.2 MIXED HYPERLIPIDEMIA: ICD-10-CM

## 2025-04-30 DIAGNOSIS — E11.8 TYPE 2 DIABETES MELLITUS WITH UNSPECIFIED COMPLICATIONS: ICD-10-CM

## 2025-04-30 DIAGNOSIS — I10 ESSENTIAL (PRIMARY) HYPERTENSION: ICD-10-CM

## 2025-04-30 PROCEDURE — 99214 OFFICE O/P EST MOD 30 MIN: CPT | Performed by: NURSE PRACTITIONER

## 2025-04-30 RX ORDER — PRAVASTATIN SODIUM 20 MG
20 TABLET ORAL DAILY
Qty: 90 TABLET | Refills: 0 | Status: SHIPPED | OUTPATIENT
Start: 2025-04-30

## 2025-04-30 RX ORDER — SEMAGLUTIDE 0.68 MG/ML
0.5 INJECTION, SOLUTION SUBCUTANEOUS WEEKLY
Qty: 9 ML | Refills: 0 | Status: SHIPPED | OUTPATIENT
Start: 2025-04-30

## 2025-04-30 RX ORDER — LISINOPRIL 5 MG/1
5 TABLET ORAL DAILY
Qty: 90 TABLET | Refills: 0 | Status: SHIPPED | OUTPATIENT
Start: 2025-04-30

## 2025-04-30 NOTE — ASSESSMENT & PLAN NOTE
Will reduce Ozempic dose to 0.5 mg. Advised not to stop suddenly. Continue efforts with diabetic diet.   Orders:    Insulin Glargine (LANTUS SOLOSTAR) 100 UNIT/ML injection pen; Inject 40 Units under the skin into the appropriate area as directed Daily.    Semaglutide,0.25 or 0.5MG/DOS, (Ozempic, 0.25 or 0.5 MG/DOSE,) 2 MG/3ML solution pen-injector; Inject 0.5 mg under the skin into the appropriate area as directed 1 (One) Time Per Week.

## 2025-04-30 NOTE — PROGRESS NOTES
Chief Complaint  Axel Simeon presents to [facility] for Diabetes    Subjective          History of Present Illness    Axel is here today to follow up on diabetes. Recent A1C 6.6. Current medication is Ozempic and Lantus. Has previously met with dietician/diabetes educator. UTD eye exam with Maurice. Also on Lisinopril and pravastatin 20 mg daily for HTN and hyperlipidemia. Recent kidney labs improved. He is interested in trying to stop Ozempic.      Review of Systems      No Known Allergies   Past Medical History:   Diagnosis Date    Coronavirus infection, unspecified     Diverticulitis 2009    DR. GIORDANO    Essential (primary) hypertension     Hyperlipidemia, unspecified     Type 2 diabetes mellitus with unspecified complications      Current Outpatient Medications   Medication Sig Dispense Refill    Insulin Glargine (LANTUS SOLOSTAR) 100 UNIT/ML injection pen Inject 40 Units under the skin into the appropriate area as directed Daily. 40 mL 0    lisinopril (PRINIVIL,ZESTRIL) 5 MG tablet Take 1 tablet by mouth Daily. 90 tablet 0    pravastatin (PRAVACHOL) 20 MG tablet Take 1 tablet by mouth Daily. 90 tablet 0    Semaglutide,0.25 or 0.5MG/DOS, (Ozempic, 0.25 or 0.5 MG/DOSE,) 2 MG/3ML solution pen-injector Inject 0.5 mg under the skin into the appropriate area as directed 1 (One) Time Per Week. 9 mL 0     No current facility-administered medications for this visit.     Past Surgical History:   Procedure Laterality Date    COLONOSCOPY      INTENSE REMOVAL FOR DIVERTICULITIS IN 2009       Social History     Tobacco Use    Smoking status: Never     Passive exposure: Never    Smokeless tobacco: Never   Vaping Use    Vaping status: Never Used   Substance Use Topics    Alcohol use: Yes     Comment: 1x monthly    Drug use: Never     History reviewed. No pertinent family history.  There are no preventive care reminders to display for this patient.   Immunization History  "  Administered Date(s) Administered    COVID-19 (JUAN) 08/13/2021        Objective     Vitals:    04/30/25 1600 04/30/25 1636   BP: 138/93 148/82   Pulse: 68 65   Temp: 98 °F (36.7 °C)    TempSrc: Oral    SpO2: 97%    Weight: 95.7 kg (211 lb)    Height: 185.4 cm (72.99\")      Body mass index is 27.85 kg/m².                No results found.    Physical Exam  Vitals reviewed.   Constitutional:       General: He is not in acute distress.     Appearance: Normal appearance. He is well-developed.   HENT:      Head: Normocephalic and atraumatic.   Cardiovascular:      Rate and Rhythm: Normal rate and regular rhythm.   Pulmonary:      Effort: Pulmonary effort is normal.      Breath sounds: Normal breath sounds.   Neurological:      Mental Status: He is alert and oriented to person, place, and time.   Psychiatric:         Mood and Affect: Mood and affect normal.           Result Review :     The following data was reviewed by: JITENDRA Osorio on 04/30/2025:          Microalbumin / Creatinine Urine Ratio - Urine, Clean Catch (04/25/2025 08:36)  Hemoglobin A1c (04/25/2025 08:31)  Lipid Panel (04/25/2025 08:31)  Comprehensive Metabolic Panel (04/25/2025 08:31)                Assessment and Plan      Assessment & Plan  Type 2 diabetes mellitus with unspecified complications    Will reduce Ozempic dose to 0.5 mg. Advised not to stop suddenly. Continue efforts with diabetic diet.   Orders:    Insulin Glargine (LANTUS SOLOSTAR) 100 UNIT/ML injection pen; Inject 40 Units under the skin into the appropriate area as directed Daily.    Semaglutide,0.25 or 0.5MG/DOS, (Ozempic, 0.25 or 0.5 MG/DOSE,) 2 MG/3ML solution pen-injector; Inject 0.5 mg under the skin into the appropriate area as directed 1 (One) Time Per Week.    Essential (primary) hypertension  Blood pressure elevated.  Continue current treatment regimen.  He will start to monitor at home and let me know of concerns.   Blood pressure will be reassessed in 3 " months.  May need to increase Lisinopril dose if remains elevated at next visit.     Orders:    lisinopril (PRINIVIL,ZESTRIL) 5 MG tablet; Take 1 tablet by mouth Daily.    Mixed hyperlipidemia     Medical condition is stable.  Continue same therapy.  Will recheck at next regular appointment    Orders:    pravastatin (PRAVACHOL) 20 MG tablet; Take 1 tablet by mouth Daily.              Follow Up     Return in about 3 months (around 7/30/2025).

## 2025-04-30 NOTE — ASSESSMENT & PLAN NOTE
Blood pressure elevated.  Continue current treatment regimen.  He will start to monitor at home and let me know of concerns.   Blood pressure will be reassessed in 3 months.  May need to increase Lisinopril dose if remains elevated at next visit.     Orders:    lisinopril (PRINIVIL,ZESTRIL) 5 MG tablet; Take 1 tablet by mouth Daily.

## 2025-07-30 ENCOUNTER — OFFICE VISIT (OUTPATIENT)
Dept: FAMILY MEDICINE CLINIC | Age: 65
End: 2025-07-30
Payer: COMMERCIAL

## 2025-07-30 VITALS
HEART RATE: 63 BPM | SYSTOLIC BLOOD PRESSURE: 173 MMHG | TEMPERATURE: 97.9 F | WEIGHT: 217.6 LBS | HEIGHT: 73 IN | BODY MASS INDEX: 28.84 KG/M2 | DIASTOLIC BLOOD PRESSURE: 106 MMHG | OXYGEN SATURATION: 96 %

## 2025-07-30 DIAGNOSIS — E11.8 TYPE 2 DIABETES MELLITUS WITH UNSPECIFIED COMPLICATIONS: ICD-10-CM

## 2025-07-30 DIAGNOSIS — E78.2 MIXED HYPERLIPIDEMIA: ICD-10-CM

## 2025-07-30 DIAGNOSIS — I10 ESSENTIAL (PRIMARY) HYPERTENSION: ICD-10-CM

## 2025-07-30 LAB
EXPIRATION DATE: ABNORMAL
HBA1C MFR BLD: 6.8 % (ref 4.5–5.7)
Lab: ABNORMAL

## 2025-07-30 RX ORDER — LISINOPRIL 10 MG/1
10 TABLET ORAL DAILY
Qty: 90 TABLET | Refills: 1 | Status: SHIPPED | OUTPATIENT
Start: 2025-07-30

## 2025-07-30 RX ORDER — SEMAGLUTIDE 0.68 MG/ML
0.5 INJECTION, SOLUTION SUBCUTANEOUS WEEKLY
Qty: 9 ML | Refills: 0 | Status: SHIPPED | OUTPATIENT
Start: 2025-07-30

## 2025-07-30 RX ORDER — PRAVASTATIN SODIUM 20 MG
20 TABLET ORAL DAILY
Qty: 90 TABLET | Refills: 1 | Status: SHIPPED | OUTPATIENT
Start: 2025-07-30

## 2025-07-30 NOTE — ASSESSMENT & PLAN NOTE
{Diabetes (Optional):8088583603}  A1C 6.8. Medical condition is stable.  Continue same therapy.  Will recheck at next regular appointment    Orders:    POC Glycosylated Hemoglobin (Hb A1C)    Semaglutide,0.25 or 0.5MG/DOS, (Ozempic, 0.25 or 0.5 MG/DOSE,) 2 MG/3ML solution pen-injector; Inject 0.5 mg under the skin into the appropriate area as directed 1 (One) Time Per Week.    Insulin Glargine (LANTUS SOLOSTAR) 100 UNIT/ML injection pen; Inject 40 Units under the skin into the appropriate area as directed Daily.

## 2025-07-30 NOTE — ASSESSMENT & PLAN NOTE
{Hyperlipidemia A/P Block (Optional):2768460458}  Medical condition is stable.  Continue same therapy.  Will recheck at next regular appointment    Orders:    pravastatin (PRAVACHOL) 20 MG tablet; Take 1 tablet by mouth Daily.

## 2025-07-30 NOTE — PROGRESS NOTES
Chief Complaint  Axel Simeon presents to [facility] for Diabetes (Follow up )    Subjective          History of Present Illness    Axel is following up on diabetes. Last A1C 6.6. Current medication is Ozempic and Lantus. Has previously met with dietician/diabetes educator. UTD eye exam with Maurice. Also on Lisinopril 5 mg daily and pravastatin 20 mg daily for HTN and hyperlipidemia. Wanted to stop Ozempic at last visit. Weaned down to 0.5 mg dose. He reports FBS have typically been 110s but have been up to 140s recently.    Review of Systems      No Known Allergies   Past Medical History:   Diagnosis Date    Coronavirus infection, unspecified     Diverticulitis 2009    DR. GIORDANO    Essential (primary) hypertension     Hyperlipidemia, unspecified     Type 2 diabetes mellitus with unspecified complications      Current Outpatient Medications   Medication Sig Dispense Refill    Insulin Glargine (LANTUS SOLOSTAR) 100 UNIT/ML injection pen Inject 40 Units under the skin into the appropriate area as directed Daily. 40 mL 0    lisinopril (PRINIVIL,ZESTRIL) 10 MG tablet Take 1 tablet by mouth Daily. 90 tablet 1    pravastatin (PRAVACHOL) 20 MG tablet Take 1 tablet by mouth Daily. 90 tablet 1    Semaglutide,0.25 or 0.5MG/DOS, (Ozempic, 0.25 or 0.5 MG/DOSE,) 2 MG/3ML solution pen-injector Inject 0.5 mg under the skin into the appropriate area as directed 1 (One) Time Per Week. 9 mL 0     No current facility-administered medications for this visit.     Past Surgical History:   Procedure Laterality Date    COLONOSCOPY      INTENSE REMOVAL FOR DIVERTICULITIS IN 2009       Social History     Tobacco Use    Smoking status: Never     Passive exposure: Never    Smokeless tobacco: Never   Vaping Use    Vaping status: Never Used   Substance Use Topics    Alcohol use: Yes     Comment: 1x monthly    Drug use: Never     History reviewed. No pertinent family history.  There are no  "preventive care reminders to display for this patient.     Immunization History   Administered Date(s) Administered    COVID-19 (JUAN) 08/13/2021        Objective     Vitals:    07/30/25 1549 07/30/25 1616   BP: 157/82 (!) 173/106   BP Location: Left arm    Patient Position: Sitting    Pulse: 63    Temp: 97.9 °F (36.6 °C)    TempSrc: Oral    SpO2: 96%    Weight: 98.7 kg (217 lb 9.6 oz)    Height: 185.4 cm (72.99\")      Body mass index is 28.72 kg/m².                No results found.    Physical Exam  Vitals reviewed.   Constitutional:       General: He is not in acute distress.     Appearance: Normal appearance. He is well-developed.   HENT:      Head: Normocephalic and atraumatic.   Cardiovascular:      Rate and Rhythm: Normal rate and regular rhythm.      Pulses:           Dorsalis pedis pulses are 2+ on the right side and 2+ on the left side.   Pulmonary:      Effort: Pulmonary effort is normal.      Breath sounds: Normal breath sounds.   Feet:      Right foot:      Protective Sensation: 3 sites tested.  3 sites sensed.      Skin integrity: Skin integrity normal. No ulcer or blister.      Toenail Condition: Right toenails are normal.      Left foot:      Protective Sensation: 3 sites tested.  3 sites sensed.      Skin integrity: Skin integrity normal. No ulcer or blister.      Toenail Condition: Left toenails are normal.      Comments: Diabetic Foot Exam Performed and Monofilament Test Performed     Neurological:      Mental Status: He is alert and oriented to person, place, and time.   Psychiatric:         Mood and Affect: Mood and affect normal.           Result Review :     The following data was reviewed by: JITENDRA Osorio on 07/30/2025:          POC Glycosylated Hemoglobin (Hb A1C) (07/30/2025 16:01)                   Assessment and Plan      Assessment & Plan  Type 2 diabetes mellitus with unspecified complications    A1C 6.8. Medical condition is stable.  Continue same therapy.  Will recheck at " next regular appointment    Orders:    POC Glycosylated Hemoglobin (Hb A1C)    Semaglutide,0.25 or 0.5MG/DOS, (Ozempic, 0.25 or 0.5 MG/DOSE,) 2 MG/3ML solution pen-injector; Inject 0.5 mg under the skin into the appropriate area as directed 1 (One) Time Per Week.    Insulin Glargine (LANTUS SOLOSTAR) 100 UNIT/ML injection pen; Inject 40 Units under the skin into the appropriate area as directed Daily.    Essential (primary) hypertension    BP elevated. Will increase Lisinopril to 10 mg daily. BP log.  Orders:    lisinopril (PRINIVIL,ZESTRIL) 10 MG tablet; Take 1 tablet by mouth Daily.    Mixed hyperlipidemia     Medical condition is stable.  Continue same therapy.  Will recheck at next regular appointment    Orders:    pravastatin (PRAVACHOL) 20 MG tablet; Take 1 tablet by mouth Daily.              Follow Up     Return in about 3 months (around 10/30/2025).

## 2025-07-30 NOTE — ASSESSMENT & PLAN NOTE
{Hypertension is (optional):[id number]}  BP elevated. Will increase Lisinopril to 10 mg daily. BP log.  Orders:    lisinopril (PRINIVIL,ZESTRIL) 10 MG tablet; Take 1 tablet by mouth Daily.

## 2025-08-12 DIAGNOSIS — I10 ESSENTIAL (PRIMARY) HYPERTENSION: ICD-10-CM

## 2025-08-12 RX ORDER — LISINOPRIL 5 MG/1
5 TABLET ORAL DAILY
Qty: 90 TABLET | Refills: 0 | OUTPATIENT
Start: 2025-08-12

## 2025-08-13 ENCOUNTER — RESULTS FOLLOW-UP (OUTPATIENT)
Dept: FAMILY MEDICINE CLINIC | Age: 65
End: 2025-08-13

## 2025-08-13 ENCOUNTER — OFFICE VISIT (OUTPATIENT)
Dept: FAMILY MEDICINE CLINIC | Age: 65
End: 2025-08-13
Payer: COMMERCIAL

## 2025-08-13 VITALS
TEMPERATURE: 97.4 F | WEIGHT: 216.8 LBS | SYSTOLIC BLOOD PRESSURE: 156 MMHG | OXYGEN SATURATION: 96 % | HEIGHT: 73 IN | HEART RATE: 94 BPM | DIASTOLIC BLOOD PRESSURE: 94 MMHG | BODY MASS INDEX: 28.73 KG/M2

## 2025-08-13 DIAGNOSIS — I10 ESSENTIAL (PRIMARY) HYPERTENSION: ICD-10-CM

## 2025-08-13 DIAGNOSIS — E11.8 TYPE 2 DIABETES MELLITUS WITH UNSPECIFIED COMPLICATIONS: Primary | ICD-10-CM
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
## 2025-08-13 LAB — GLUCOSE BLDC GLUCOMTR-MCNC: 226 MG/DL (ref 70–130)

## 2025-08-13 PROCEDURE — 99213 OFFICE O/P EST LOW 20 MIN: CPT

## 2025-08-13 PROCEDURE — 82948 REAGENT STRIP/BLOOD GLUCOSE: CPT

## 2025-08-14 ENCOUNTER — TELEPHONE (OUTPATIENT)
Dept: FAMILY MEDICINE CLINIC | Age: 65
End: 2025-08-14
Payer: COMMERCIAL